# Patient Record
Sex: FEMALE | Race: BLACK OR AFRICAN AMERICAN | NOT HISPANIC OR LATINO | Employment: OTHER | URBAN - METROPOLITAN AREA
[De-identification: names, ages, dates, MRNs, and addresses within clinical notes are randomized per-mention and may not be internally consistent; named-entity substitution may affect disease eponyms.]

---

## 2017-02-03 ENCOUNTER — TELEPHONE (OUTPATIENT)
Dept: NEUROLOGY | Facility: CLINIC | Age: 37
End: 2017-02-03

## 2017-03-16 ENCOUNTER — OFFICE VISIT (OUTPATIENT)
Dept: INTERNAL MEDICINE | Facility: CLINIC | Age: 37
End: 2017-03-16
Payer: MEDICARE

## 2017-03-16 ENCOUNTER — OFFICE VISIT (OUTPATIENT)
Dept: NEUROLOGY | Facility: CLINIC | Age: 37
End: 2017-03-16
Payer: MEDICARE

## 2017-03-16 ENCOUNTER — LAB VISIT (OUTPATIENT)
Dept: LAB | Facility: HOSPITAL | Age: 37
End: 2017-03-16
Attending: PSYCHIATRY & NEUROLOGY
Payer: MEDICARE

## 2017-03-16 VITALS
HEART RATE: 69 BPM | BODY MASS INDEX: 28.21 KG/M2 | SYSTOLIC BLOOD PRESSURE: 111 MMHG | HEIGHT: 66 IN | DIASTOLIC BLOOD PRESSURE: 72 MMHG | WEIGHT: 175.5 LBS

## 2017-03-16 VITALS
BODY MASS INDEX: 28.16 KG/M2 | DIASTOLIC BLOOD PRESSURE: 72 MMHG | HEIGHT: 66 IN | WEIGHT: 175.25 LBS | TEMPERATURE: 98 F | SYSTOLIC BLOOD PRESSURE: 119 MMHG | HEART RATE: 71 BPM

## 2017-03-16 DIAGNOSIS — G70.00 MYASTHENIA GRAVIS WITHOUT EXACERBATION: ICD-10-CM

## 2017-03-16 DIAGNOSIS — M79.7 FIBROMYALGIA: ICD-10-CM

## 2017-03-16 DIAGNOSIS — E09.9 STEROID-INDUCED DIABETES: ICD-10-CM

## 2017-03-16 DIAGNOSIS — I15.2 HYPERTENSION ASSOCIATED WITH DIABETES: ICD-10-CM

## 2017-03-16 DIAGNOSIS — G89.29 NECK PAIN, CHRONIC: ICD-10-CM

## 2017-03-16 DIAGNOSIS — T38.0X5A STEROID-INDUCED DIABETES: ICD-10-CM

## 2017-03-16 DIAGNOSIS — G89.29 CHRONIC BILATERAL LOW BACK PAIN WITHOUT SCIATICA: ICD-10-CM

## 2017-03-16 DIAGNOSIS — G70.00 MYASTHENIA GRAVIS: ICD-10-CM

## 2017-03-16 DIAGNOSIS — R07.9 CHEST PAIN, UNSPECIFIED TYPE: ICD-10-CM

## 2017-03-16 DIAGNOSIS — G70.00 MG (MYASTHENIA GRAVIS): Primary | ICD-10-CM

## 2017-03-16 DIAGNOSIS — M54.2 NECK PAIN, CHRONIC: ICD-10-CM

## 2017-03-16 DIAGNOSIS — M54.50 CHRONIC BILATERAL LOW BACK PAIN WITHOUT SCIATICA: ICD-10-CM

## 2017-03-16 DIAGNOSIS — G70.00 MG (MYASTHENIA GRAVIS): ICD-10-CM

## 2017-03-16 DIAGNOSIS — M79.10 MUSCLE PAIN: Primary | ICD-10-CM

## 2017-03-16 DIAGNOSIS — E11.59 HYPERTENSION ASSOCIATED WITH DIABETES: ICD-10-CM

## 2017-03-16 DIAGNOSIS — E78.2 MIXED HYPERLIPIDEMIA: ICD-10-CM

## 2017-03-16 LAB
ALBUMIN SERPL BCP-MCNC: 3.6 G/DL
ALBUMIN SERPL BCP-MCNC: 3.6 G/DL
ALP SERPL-CCNC: 104 U/L
ALP SERPL-CCNC: 104 U/L
ALT SERPL W/O P-5'-P-CCNC: 26 U/L
ALT SERPL W/O P-5'-P-CCNC: 26 U/L
ANION GAP SERPL CALC-SCNC: 7 MMOL/L
AST SERPL-CCNC: 23 U/L
AST SERPL-CCNC: 23 U/L
BASOPHILS # BLD AUTO: 0.02 K/UL
BASOPHILS NFR BLD: 0.3 %
BILIRUB DIRECT SERPL-MCNC: 0.3 MG/DL
BILIRUB SERPL-MCNC: 0.8 MG/DL
BILIRUB SERPL-MCNC: 0.8 MG/DL
BUN SERPL-MCNC: 17 MG/DL
CALCIUM SERPL-MCNC: 9.6 MG/DL
CHLORIDE SERPL-SCNC: 108 MMOL/L
CO2 SERPL-SCNC: 28 MMOL/L
CREAT SERPL-MCNC: 0.8 MG/DL
DIFFERENTIAL METHOD: ABNORMAL
EOSINOPHIL # BLD AUTO: 0 K/UL
EOSINOPHIL NFR BLD: 0.1 %
ERYTHROCYTE [DISTWIDTH] IN BLOOD BY AUTOMATED COUNT: 15.9 %
EST. GFR  (AFRICAN AMERICAN): >60 ML/MIN/1.73 M^2
EST. GFR  (NON AFRICAN AMERICAN): >60 ML/MIN/1.73 M^2
GLUCOSE SERPL-MCNC: 82 MG/DL
HCT VFR BLD AUTO: 39.8 %
HGB BLD-MCNC: 12.4 G/DL
LYMPHOCYTES # BLD AUTO: 0.7 K/UL
LYMPHOCYTES NFR BLD: 10.3 %
MCH RBC QN AUTO: 30.6 PG
MCHC RBC AUTO-ENTMCNC: 31.2 %
MCV RBC AUTO: 98 FL
MONOCYTES # BLD AUTO: 0.4 K/UL
MONOCYTES NFR BLD: 5.2 %
NEUTROPHILS # BLD AUTO: 5.8 K/UL
NEUTROPHILS NFR BLD: 83.8 %
PLATELET # BLD AUTO: 205 K/UL
PMV BLD AUTO: 13.5 FL
POTASSIUM SERPL-SCNC: 3.9 MMOL/L
PROT SERPL-MCNC: 7.3 G/DL
PROT SERPL-MCNC: 7.3 G/DL
RBC # BLD AUTO: 4.05 M/UL
SODIUM SERPL-SCNC: 143 MMOL/L
WBC # BLD AUTO: 6.92 K/UL

## 2017-03-16 PROCEDURE — 99999 PR PBB SHADOW E&M-EST. PATIENT-LVL IV: CPT | Mod: PBBFAC,,, | Performed by: PHYSICIAN ASSISTANT

## 2017-03-16 PROCEDURE — 99999 PR PBB SHADOW E&M-EST. PATIENT-LVL III: CPT | Mod: PBBFAC,,, | Performed by: INTERNAL MEDICINE

## 2017-03-16 PROCEDURE — 99214 OFFICE O/P EST MOD 30 MIN: CPT | Mod: S$PBB,,, | Performed by: PHYSICIAN ASSISTANT

## 2017-03-16 PROCEDURE — 80053 COMPREHEN METABOLIC PANEL: CPT

## 2017-03-16 PROCEDURE — 83036 HEMOGLOBIN GLYCOSYLATED A1C: CPT

## 2017-03-16 PROCEDURE — 80076 HEPATIC FUNCTION PANEL: CPT

## 2017-03-16 PROCEDURE — 99214 OFFICE O/P EST MOD 30 MIN: CPT | Mod: S$PBB,,, | Performed by: INTERNAL MEDICINE

## 2017-03-16 PROCEDURE — 36415 COLL VENOUS BLD VENIPUNCTURE: CPT

## 2017-03-16 PROCEDURE — 85025 COMPLETE CBC W/AUTO DIFF WBC: CPT

## 2017-03-16 RX ORDER — PYRIDOSTIGMINE BROMIDE 60 MG/1
TABLET ORAL
Qty: 120 TABLET | Refills: 5 | Status: SHIPPED | OUTPATIENT
Start: 2017-03-16 | End: 2019-02-13 | Stop reason: SDUPTHER

## 2017-03-16 RX ORDER — PYRIDOSTIGMINE BROMIDE 180 MG/1
180 TABLET, EXTENDED RELEASE ORAL DAILY
Qty: 30 TABLET | Refills: 5 | Status: SHIPPED | OUTPATIENT
Start: 2017-03-16 | End: 2017-05-05 | Stop reason: SDUPTHER

## 2017-03-16 RX ORDER — AZATHIOPRINE 50 MG/1
100 TABLET ORAL 2 TIMES DAILY
Qty: 120 TABLET | Refills: 5 | Status: SHIPPED | OUTPATIENT
Start: 2017-03-16 | End: 2017-07-13 | Stop reason: SDUPTHER

## 2017-03-16 NOTE — MR AVS SNAPSHOT
[facility]  [address]  [facility] [address]  Phone: [phone]  Fax: [phone]                  Vicky Joy   3/16/2017 10:40 AM   Office Visit    Description:  Female : 1980   Provider:  Nirmal Razo II, MD   Department:  [facility] Internal [facility]           Reason for Visit     Hypertension     Diabetes           Diagnoses this Visit        Comments    Muscle pain    -  Primary     Hypertension associated with diabetes         Steroid-induced diabetes         MG (myasthenia gravis)         Mixed hyperlipidemia         Fibromyalgia                To Do List           Goals (5 Years of Data)     None      Follow-Up and Disposition     Return in about 6 months (around 2017).      [facility] On Call     [facility] On Call Nurse [facility] Line -  Assistance  Registered nurses in the [facility] On Call Center provide clinical advisement, health education, appointment booking, and other advisory services.  Call for this free service at [phone].             Medications           Message regarding Medications     Verify the changes and/or additions to your medication regime listed below are the same as discussed with your clinician today.  If any of these changes or additions are incorrect, please notify your healthcare provider.        STOP taking these medications     sulfamethoxazole-trimethoprim 800-160mg (BACTRIM DS) 800-160 mg Tab Take 1 tablet by mouth 3 (three) times daily.    oxycodone-acetaminophen (PERCOCET) 5-325 mg per tablet     gabapentin (NEURONTIN) 100 MG capsule Take 1 capsule (100 mg total) by mouth 3 (three) times daily.           Verify that the below list of medications is an accurate representation of the medications you are currently taking.  If none reported, the list may be blank. If incorrect, please contact your healthcare provider. Carry this list with you in case of emergency.           Current Medications     azathioprine  "(IMURAN) 50 mg Tab Take 2 tablets (100 mg total) by mouth 2 (two) times daily.    blood sugar diagnostic Strp 1 strip by Misc.(Non-Drug; Combo Route) route 2 (two) times daily before meals.    glipiZIDE (GLUCOTROL) 10 MG TR24 Take 1 tablet (10 mg total) by mouth daily with breakfast.    lancets (BD ULTRA FINE LANCETS) 33 gauge Misc 1 lancet by Misc.(Non-Drug; Combo Route) route 2 (two) times daily before meals.    lisinopril (PRINIVIL,ZESTRIL) 20 MG tablet Take 1 tablet (20 mg total) by mouth once daily.    meloxicam (MOBIC) 7.5 MG tablet TAKE 1 TABLET (7.5 MG TOTAL) BY MOUTH ONCE DAILY.    metformin (GLUCOPHAGE) 1000 MG tablet Take 1 tablet (1,000 mg total) by mouth 2 (two) times daily with meals.    nystatin (MYCOSTATIN) cream Apply topically 2 (two) times daily. FOR YEAST INFECTION UNDER BREASTS    omeprazole (PRILOSEC) 20 MG capsule TAKE 1 CAPSULE BY MOUTH ONCE DAILY.    penicillin v potassium (VEETID) 500 MG tablet 500 mg.    predniSONE (DELTASONE) 10 MG tablet Take 1 tablet (10 mg total) by mouth once daily.    pyridostigmine (MESTINON) 180 mg TbSR Take 1 tablet (180 mg total) by mouth once daily.    pyridostigmine (MESTINON) 60 mg Tab TAKE 1/2 TABLET BY MOUTH EVERY 4 HOURS    rosuvastatin (CRESTOR) 20 MG tablet Take 1 tablet (20 mg total) by mouth once daily.    vitamin D 1000 units Tab Take 185 mg by mouth once daily.    calcium carbonate (OS-MARISOL) 500 mg calcium (1,250 mg) tablet Take 1 tablet (500 mg total) by mouth 2 (two) times daily.           Clinical Reference Information           Your Vitals Were     BP Pulse Temp Height    119/72 (BP Location: Left arm, Patient Position: Sitting, BP Method: Automatic) 71 97.7 °F (36.5 °C) (Oral) 5' 6" (1.676 m)    Weight Last Period BMI    79.5 kg (175 lb 4.3 oz) 02/18/2017 (Exact Date) 28.29 kg/m2      Blood Pressure          Most Recent Value    BP  119/72      Allergies as of 3/16/2017     Cellcept [Mycophenolate Mofetil]      Immunizations Administered on Date " of Encounter - 3/16/2017     None      Orders Placed During Today's Visit     Future Labs/Procedures Expected by Expires    Comprehensive metabolic panel  3/16/2017 6/14/2017    Hemoglobin A1c  3/16/2017 6/14/2017      Language Assistance Services     ATTENTION: Language assistance services are available, free of charge. Please call [phone].      ATENCIÓN: Si habla español, tiene a garcia disposición servicios gratuitos de asistencia lingüística. Llame al [phone].     CHÚ Ý: N?u b?n nói Ti?ng Vi?t, có các d?ch v? h? tr? ngôn ng? mi?n phí dành cho b?n. G?i s? [phone].         [facility] complies with applicable Federal civil rights laws and does not discriminate on the basis of race, color, national origin, age, disability, or sex.

## 2017-03-16 NOTE — PROGRESS NOTES
[facility], Department of Neurology   [facility] [place] [address]  Phone:[phone]  Fax: [phone]    Patient Name: Vicky Joy  : 1980  MRN:  [id number]    3/16/2017     chief complaint: myasthenia gravis     PCP: Nirmal Razo Ii, MD.    DX: Myasthenia gravis  Thymic status: Thymectomy fall   Maintenance: Imuran 100mg/50mg since 2015  Pred 20mg/d -> 15mg in May of 2015 --> 11 mg 2016   Mestinon 30mg q4, Timespan 180mg qAM  Rescue: PLEX; never had IVIG  Prior immunemodulatory agents: cellcept    Interval hx 3/16/17:  Vicky Joy is a 35 yo female with MG dx , s/p thymectomy .     Currently on Prednisone 10 mg qd, Imuran 100/100 mg, Timespan 180 mg qAM, and Mestinon 30 mg q4.     Notes SULLIVAN. No difficulty chewing/swallowing. Intermittent ptosis and diplopia which usually lasts a few minutes. Also notes watery eyes.     Notes chronic neck and back pain. Also pain in legs. Describes as intermittent aching. Worse with activity. PCP prescribed Gabapentin, has not helped. Hasn't done PT.     Also notes chronic chest pain since thymectomy. Occurs about every other day, usually lasts a few hrs. Takes Tylenol with minor relief.     Interval hx 16:  Vicky Joy is a 35 yo female with generalized MG dx'ed in , s/p thymectomy in . Symptoms are stable from last visit. Still has good and bad days. Has some occasional SULLIVAN with walking, takes a break and can keep going. Arms and legs intermittently weak. Diplopia has improved. Has wisdom tooth with pain, eating soft food, no problems chewing/swallowing     Interval hx 10/7/16:  Vicky Joy is a 35 yo female with generalized MG dx'ed in , s/p thymectomy in . Has been about 9 mos since last clinic visit. She states she has been doing fairly well since last being seen. However, also states she has some bad days which occur a few times a week, on these days she  "feels tired, lays in bed, "doesn't feel well", also states feels weaker in arms and legs. Has intermittent diplopia about once per month. Also complains of increased SULLIVAN over the past few weeks, occurs when walking. +weight gain 2/2 prednisone. Denies difficulty chewing or swallowing. DM currently well-controlled.    Current medications include:  Prednisone 11 mg daily (last visit was decreased from 15 mg to 12, has been taking 11 mg)   Imuran 100/50   Timespan 180 qam   Mestinon 30 q 4x/day     Interval 1/13/16: Doing well. Occasional ptosis but no diplopia. No dysarthria or dysphagia.     Interval 9/18/15: Doing about the same, to a little weaker in her shoulders. No other new symptoms.     Interval 5/8/15: Now taking imuran 100mg bid (she weighs 86kg, so just over 2mg/kg). Doing well from MG standpoint.     Interval 2/6/15: Missed appointment with me in Nov. Returns today. MG stable, but having back pain. Last bone scans in July 2014.      Was supposed to be on imuran 100mg bid, but only taking it 50mg bid. Comes on bottle as 50mg bid, despite my writing it otherwise.      Interval 10/7/14: Doing about the same. Still with end of the day diplopia and ptosis. No chewing or swallowing difficulties. Strength is good.      HPI: Vicky Joy is a 35 y.o. female with generalized MG who returns today for f/u. She increased her imuran as I instructed her to do. Her disease burden is better.    HPI 2/18/13: Vicky Joy is a 32 y.o. female with a diagnosis of generalized MG diagnosed in 2010 after presenting with difficulty chewing, swallowing and generalized weakness. She presented at that time in crisis and was intubated and admitted to a critical care unit, where the diagnosis was made.      She has been on prednisone and mestinon since her diagnosis, and is currently on 25mg/day. She has not had any subsequent hospitalizations, but has had several rounds of PLEX as an outpatient. Her last exchange was (she " matthew) was in 2012.      She has diplopia and generalized weakness at baseline, but in the last few weeks, she has had increasing lower extremity weakness and slightly more difficulty swallowing.     She had a thymectomy in the fall of 2011.     Medications:   Current Outpatient Prescriptions   Medication Sig Dispense Refill    azathioprine (IMURAN) 50 mg Tab Take 2 tablets (100 mg total) by mouth 2 (two) times daily. 120 tablet 5    blood sugar diagnostic Strp 1 strip by Misc.(Non-Drug; Combo Route) route 2 (two) times daily before meals. 50 strip 3    glipiZIDE (GLUCOTROL) 10 MG TR24 Take 1 tablet (10 mg total) by mouth daily with breakfast. 90 tablet 3    lancets (BD ULTRA FINE LANCETS) 33 gauge Misc 1 lancet by Misc.(Non-Drug; Combo Route) route 2 (two) times daily before meals. 50 each 3    lisinopril (PRINIVIL,ZESTRIL) 20 MG tablet Take 1 tablet (20 mg total) by mouth once daily. 90 tablet 3    meloxicam (MOBIC) 7.5 MG tablet TAKE 1 TABLET (7.5 MG TOTAL) BY MOUTH ONCE DAILY. 90 tablet 3    metformin (GLUCOPHAGE) 1000 MG tablet Take 1 tablet (1,000 mg total) by mouth 2 (two) times daily with meals. 180 tablet 3    nystatin (MYCOSTATIN) cream Apply topically 2 (two) times daily. FOR YEAST INFECTION UNDER BREASTS 60 g 3    omeprazole (PRILOSEC) 20 MG capsule TAKE 1 CAPSULE BY MOUTH ONCE DAILY. 90 capsule 2    penicillin v potassium (VEETID) 500 MG tablet 500 mg.      predniSONE (DELTASONE) 10 MG tablet Take 1 tablet (10 mg total) by mouth once daily. 30 tablet 5    pyridostigmine (MESTINON) 180 mg TbSR Take 1 tablet (180 mg total) by mouth once daily. 30 tablet 5    pyridostigmine (MESTINON) 60 mg Tab TAKE 1/2 TABLET BY MOUTH EVERY 4 HOURS 120 tablet 5    rosuvastatin (CRESTOR) 20 MG tablet Take 1 tablet (20 mg total) by mouth once daily. 90 tablet 3    vitamin D 1000 units Tab Take 185 mg by mouth once daily.      calcium carbonate (OS-MARISOL) 500 mg calcium (1,250 mg) tablet Take 1 tablet (500 mg  "total) by mouth 2 (two) times daily. 180 tablet 3     No current facility-administered medications for this visit.        Allergies:  Review of patient's allergies indicates:   Allergen Reactions    Cellcept [mycophenolate mofetil]      Weakness (muscle)^       PMHx:  Past Medical History:   Diagnosis Date    Diabetes mellitus type II     Headache(784.0)     Hypertension     Myasthenia gravis without exacerbation     Obesity     Other and unspecified hyperlipidemia      Past Surgical History:   Procedure Laterality Date    SPINE SURGERY      THYMECTOMY         Fhx:  Family History   Problem Relation Age of Onset    Cancer Mother      breast cancer survivor    Diabetes Father        Shx:   Social History     Social History    Marital status: Single     Spouse name: N/A    Number of children: N/A    Years of education: N/A     Occupational History    Not on file.     Social History Main Topics    Smoking status: Never Smoker    Smokeless tobacco: Never Used    Alcohol use No    Drug use: No    Sexual activity: No     Other Topics Concern    Not on file     Social History Narrative       ROS:  Review of Systems (Questions asked; positives or additions noted in BOLD)  Gen Malaise/fatigue, weight change   HEENT Hearing loss, blurred vision, diplopia (intermittent)   Card CP, palpitations   Pulm SOB (intermittent SULLIVAN), cough    Vasc Easy bruising, easy bleeding    GI Nausea, vomiting, constipation    Frequency, urgency   M/S Joint pain, myalgias, falls    Endocrine Temperature intolerance, polyuria, polydipsia   Neuro Dizziness, tremors, seizures, focal weakness, Others- as noted in HPI   Psych Memory loss, depression, anxiety, hallucinations     PHYSICAL EXAM:   /72  Pulse 69  Ht 5' 6" (1.676 m)  Wt 79.6 kg (175 lb 7.8 oz)  LMP 02/18/2017 (Exact Date)  BMI 28.32 kg/m2   GEN:  NAD, well-developed, well-groomed.  HEENT: No cervical lymphadenopathy noted.  CARDIOVASCULAR: Radial pulses 2+ " bilaterally. No LE edema noted.  NEURO:  Mental Status: Awake, alert, and oriented. Normal attention and concentration.  Speech is fluent and appropriate language function.    Cranial Nerves:  II Pupils are round and reactive to direct and consensual light and accommodation. Visual fields full to confrontation.   III, IV, VI Extraocular eye movements full bilaterally. No ptosis noted.   V Normal facial sensation to pinprick and light touch.   VII Symmetric facial expressions.   VIII Hearing intact to finger rub bilaterally.   IX, X Palate elevates midline and symmetrically.   XI Shoulder elevation is symmetric and full strength bilaterally. Neck rotation strength is normal bilaterally.   XII Tongue is midline.     Sensory: Sensation is normal to light touch in the upper and lower extremities bilaterally. Romberg is Negative.    Motor: Extremities demonstrate normal bulk and tone in all four limbs. No atrophy or fasciculations noted.   Strength-       RUE: 5/5                LUE: 5/5                RLE: 5/5                LLE: 5/5     Neck flexion/extension: 5/5  Good orbicularis oris strength.Gppd orbicularis oculi strength  No SOB noted during exam.    Deep Tendon Relfexes: 2+ and symmetric in the upper and lower extremities bilaterally. Babinski mute bilaterally.    Coordination: Finger to nose WNL.     Gait: Normal casual gait.    ASSESSMENT:     ICD-10-CM ICD-9-CM   1. MG (myasthenia gravis) G70.00 358.00   2. Chronic bilateral low back pain without sciatica M54.5 724.2    G89.29 338.29   3. Neck pain, chronic M54.2 723.1    G89.29 338.29   4. Myasthenia gravis G70.00 358.00   5. Myasthenia gravis without exacerbation G70.00 358.00   6. Chest pain, unspecified type R07.9 786.50       PLAN:  Orders Placed This Encounter    CBC auto differential    Hepatic function panel    Ambulatory consult to Physical Therapy    Ambulatory Referral to Cardiology    Complete PFT with bronchodilator    azathioprine (IMURAN)  50 mg Tab    pyridostigmine (MESTINON) 180 mg TbSR    pyridostigmine (MESTINON) 60 mg Tab     Orders Placed This Encounter   Procedures    CBC auto differential    Hepatic function panel    Ambulatory consult to Physical Therapy    Ambulatory Referral to Cardiology    Complete PFT with bronchodilator     35 yo female with generalized MG. Symptoms remain fairly stable. Has some days where she feels tired and weaker. Query some deconditioning as well.     -labs today   -refer to Cardiology to rule out contributing cardiac component to chest pain  -PFTs  -continue current meds   -physical therapy script given for neck/back pain   -RTC 3 mos         The patient indicates understanding of these issues and agrees to the plan.      Attending, Dr. Castellon, was available during today's encounter. Any change to plan along with cosign to appear in the EMR.       This document has been electronically signed by Mable Madden PA-C on 3/16/2017, 10:56 AM. I have personally typed this message using the EMR.

## 2017-03-16 NOTE — LETTER
March 16, 2017      Nirmal Razo II, MD  [address]  [place] [address]           [facility]  [address]  [place] [address]  Phone: [phone]  Fax: [phone]          Patient: Vicky Joy   MR Number: [id number]   YOB: 1980   Date of Visit: 3/16/2017       Dear Dr. Nirmal Razo II:    Thank you for referring Vicky Joy to me for evaluation. Attached you will find relevant portions of my assessment and plan of care.    If you have questions, please do not hesitate to call me. I look forward to following Vicky Joy along with you.    Sincerely,    Mable Madden PA-C    Enclosure  CC:  No Recipients    If you would like to receive this communication electronically, please contact [email or web address] or [phone] to request more information on [employer] Link access.    For providers and/or their staff who would like to refer a patient to [facility], please contact us through our one-stop-shop provider referral line, [facility], at [phone].    If you feel you have received this communication in error or would no longer like to receive these types of communications, please e-mail [email or web address]

## 2017-03-16 NOTE — PROGRESS NOTES
Subjective:       Patient ID: Vicky Joy is a 36 y.o. female.    Chief Complaint: Hypertension and Diabetes    HPI - Ms Joy feels well today.  She's having worsening fibromyalgia; neurontin isn't working and interferes with MG control, so we need to stop it anyway. She's going to start going to PT.  Taking her antihypertensives and diabetes medication.  Last a1c at goal, but due for a repeat. She doesn't smoke or drink alcohol.    PMH:  Myasthenia on steroids long term  Steroid induced DM  HTN  Fibromyalgia     Meds:  Reviewed and reconciled in EPIC with patient during visit today.    Review of Systems   Constitutional: Negative for fever.   HENT: Negative for congestion.    Respiratory: Positive for shortness of breath (from her myasthenia).    Cardiovascular: Negative for chest pain.   Gastrointestinal: Negative for abdominal pain.   Genitourinary: Negative for difficulty urinating.   Musculoskeletal: Positive for myalgias.   Skin: Negative for rash.   Neurological: Negative for headaches.   Psychiatric/Behavioral: Negative for sleep disturbance.       Objective:      Physical Exam   Constitutional: She is oriented to person, place, and time. She appears well-developed and well-nourished.   Pleasant woman with moon facies and buffalo hump   HENT:   Head: Normocephalic and atraumatic.   Cardiovascular: Normal rate, regular rhythm and normal heart sounds.  Exam reveals no gallop and no friction rub.    No murmur heard.  Pulmonary/Chest: Effort normal and breath sounds normal. No respiratory distress. She has no wheezes. She has no rales. She exhibits no tenderness.   Musculoskeletal:   Tender in typical trigger points for FM   Neurological: She is alert and oriented to person, place, and time.   Skin: Skin is warm and dry. No erythema.   Psychiatric: She has a normal mood and affect.   Nursing note and vitals reviewed.      Assessment:       1. Muscle pain    2. Hypertension associated with diabetes     3. Steroid-induced diabetes    4. MG (myasthenia gravis)    5. Mixed hyperlipidemia    6. Fibromyalgia        Plan:       Vicky was seen today for hypertension and diabetes.    Diagnoses and all orders for this visit:    Muscle pain - stop gabapentin, start PT.  Consider an SSRI for pain control if PT isn't enough    Hypertension associated with diabetes - at goal, stay the course    Steroid-induced diabetes - at goal, time for monitoring labs  -     Hemoglobin A1c; Future  -     Comprehensive metabolic panel; Future    MG (myasthenia gravis) - sees neurology regularly.  Stable    Mixed hyperlipidemia - stable, on a statin    Fibromyalgia - worsening, as above.    rtc 6 months.    LEE Razo MD MPH  Staff Internist

## 2017-03-17 LAB
ESTIMATED AVG GLUCOSE: 148 MG/DL
HBA1C MFR BLD HPLC: 6.8 %

## 2017-04-05 DIAGNOSIS — I20.89 OTHER FORMS OF ANGINA PECTORIS: Primary | ICD-10-CM

## 2017-04-06 ENCOUNTER — HOSPITAL ENCOUNTER (OUTPATIENT)
Dept: CARDIOLOGY | Facility: CLINIC | Age: 37
Discharge: HOME | End: 2017-04-06
Payer: MEDICARE

## 2017-04-06 ENCOUNTER — OFFICE VISIT (OUTPATIENT)
Dept: CARDIOLOGY | Facility: CLINIC | Age: 37
End: 2017-04-06
Payer: MEDICARE

## 2017-04-06 VITALS
SYSTOLIC BLOOD PRESSURE: 124 MMHG | HEIGHT: 66 IN | BODY MASS INDEX: 27.99 KG/M2 | WEIGHT: 174.19 LBS | HEART RATE: 67 BPM | DIASTOLIC BLOOD PRESSURE: 74 MMHG

## 2017-04-06 DIAGNOSIS — I20.89 OTHER FORMS OF ANGINA PECTORIS: ICD-10-CM

## 2017-04-06 DIAGNOSIS — I15.2 HYPERTENSION ASSOCIATED WITH DIABETES: Primary | ICD-10-CM

## 2017-04-06 DIAGNOSIS — E11.59 HYPERTENSION ASSOCIATED WITH DIABETES: Primary | ICD-10-CM

## 2017-04-06 PROCEDURE — 93010 ELECTROCARDIOGRAM REPORT: CPT | Mod: S$PBB,,, | Performed by: INTERNAL MEDICINE

## 2017-04-06 PROCEDURE — 99203 OFFICE O/P NEW LOW 30 MIN: CPT | Mod: S$PBB,GC,, | Performed by: INTERNAL MEDICINE

## 2017-04-06 PROCEDURE — 99999 PR PBB SHADOW E&M-EST. PATIENT-LVL III: CPT | Mod: PBBFAC,GC,, | Performed by: INTERNAL MEDICINE

## 2017-04-06 PROCEDURE — 99213 OFFICE O/P EST LOW 20 MIN: CPT | Mod: PBBFAC | Performed by: INTERNAL MEDICINE

## 2017-04-06 NOTE — PROGRESS NOTES
Subjective:    Patient ID:  Vicky Joy is a 36 y.o. female who presents for evaluation of Chest Pain (x 3 weeks )      HPI  37 yo AAF with hx of myasthenia Gravis s/p Thymectomy in 2011, HLD, DM, and HTN.  She reports chest pain that is central and throbbing in nature.  The pain is non-exertional and reproducible with palpation.  There are no associated symptoms.  She reports that happens about every 3 days, and last about one hour.  She denies orthopnea or syncope.  She is a non-smoker.  No family hx of CAD.  She does have shortness of breath and muscle weakness both with exertion that she contributes her myasthenia gravis.     Past Medical History:   Diagnosis Date    Diabetes mellitus type II     Headache(784.0)     Hypertension     Myasthenia gravis without exacerbation     Obesity     Other and unspecified hyperlipidemia      Past Surgical History:   Procedure Laterality Date    SPINE SURGERY      THYMECTOMY         Review of Systems   Constitution: Negative for chills, diaphoresis and fever.   Eyes: Negative for blurred vision.   Cardiovascular: Positive for chest pain and dyspnea on exertion. Negative for claudication, irregular heartbeat, leg swelling, orthopnea, palpitations, paroxysmal nocturnal dyspnea and syncope.   Respiratory: Negative for cough and shortness of breath.    Hematologic/Lymphatic: Does not bruise/bleed easily.   Skin: Negative for rash.   Musculoskeletal: Negative for falls.   Gastrointestinal: Negative for abdominal pain, diarrhea, nausea and vomiting.   Neurological: Negative for focal weakness.        Objective:    Physical Exam   Constitutional: She is oriented to person, place, and time. No distress.   HENT:   Head: Normocephalic and atraumatic.   Eyes: Conjunctivae and EOM are normal.   Neck: No JVD present. No tracheal deviation present.   Cardiovascular: Normal rate, regular rhythm and intact distal pulses.    Pulmonary/Chest: Breath sounds normal. No stridor.  No respiratory distress.   Abdominal: Soft. Bowel sounds are normal. She exhibits no distension. There is no tenderness.   Musculoskeletal: She exhibits no edema.   Neurological: She is alert and oriented to person, place, and time. No cranial nerve deficit.   Skin: Skin is warm and dry. She is not diaphoretic.         Assessment:       1. Hypertension associated with diabetes    2. Chest pain with noncardiac features         Plan:       #Chest pain is non-cardiac in nature given reproducibility with palpation and non-exertional nature with normal ECG.  Her dyspnea on exertion seems to be more related to her muscular weakness given it's cyclic nature consistent with MG flares.   #HTN well controlled.  Agree with ace-i as the agent of choice given her DM  #no cardiology follow up needed

## 2017-04-06 NOTE — PROGRESS NOTES
I have personally taken the history and examined this patient and agree with the Fellow's note as stated above.    Chest discomfort just as likely to occur when sedentary as when active.  Low risk for having CAD and nature of discomfort suggest no further w/u indicated.

## 2017-04-06 NOTE — MR AVS SNAPSHOT
[facility] - Cardiology  [address]  [facility] [address]  Phone: [phone]                  Vicky Joy   2017 10:00 AM   Office Visit    Description:  Female : 1980   Provider:  Adarsh Sanchez Jr., MD   Department:  [facility]           Reason for Visit     Chest Pain                To Do List           Future Appointments        Provider Department Dept Phone    2017 10:00 AM MD [facility] Alicea Jr. [facility] Cardiology [phone]    2017 10:00 AM Brendan Castellon MD [facility] Neurology [phone]      Goals (5 Years of Data)     None      [facility] On [facility]     [facility] On Call Nurse Care Line -  Assistance  Unless otherwise directed by your provider, please contact [facility], our nurse care line that is available for  assistance.     Registered nurses in the [facility] Call Center provide: appointment scheduling, clinical advisement, health education, and other advisory services.  Call: [phone] (toll free)               Medications           Message regarding Medications     Verify the changes and/or additions to your medication regime listed below are the same as discussed with your clinician today.  If any of these changes or additions are incorrect, please notify your healthcare provider.        STOP taking these medications     penicillin v potassium (VEETID) 500 MG tablet 500 mg.           Verify that the below list of medications is an accurate representation of the medications you are currently taking.  If none reported, the list may be blank. If incorrect, please contact your healthcare provider. Carry this list with you in case of emergency.           Current Medications     azathioprine (IMURAN) 50 mg Tab Take 2 tablets (100 mg total) by mouth 2 (two) times daily.    blood sugar diagnostic Strp 1 strip by Misc.(Non-Drug; Combo Route) route 2 (two) times daily before meals.    calcium carbonate (OS-MARISOL)  "500 mg calcium (1,250 mg) tablet Take 1 tablet (500 mg total) by mouth 2 (two) times daily.    glipiZIDE (GLUCOTROL) 10 MG TR24 Take 1 tablet (10 mg total) by mouth daily with breakfast.    lancets (BD ULTRA FINE LANCETS) 33 gauge Misc 1 lancet by Misc.(Non-Drug; Combo Route) route 2 (two) times daily before meals.    lisinopril (PRINIVIL,ZESTRIL) 20 MG tablet Take 1 tablet (20 mg total) by mouth once daily.    meloxicam (MOBIC) 7.5 MG tablet TAKE 1 TABLET (7.5 MG TOTAL) BY MOUTH ONCE DAILY.    metformin (GLUCOPHAGE) 1000 MG tablet Take 1 tablet (1,000 mg total) by mouth 2 (two) times daily with meals.    nystatin (MYCOSTATIN) cream Apply topically 2 (two) times daily. FOR YEAST INFECTION UNDER BREASTS    omeprazole (PRILOSEC) 20 MG capsule TAKE 1 CAPSULE BY MOUTH ONCE DAILY.    predniSONE (DELTASONE) 10 MG tablet Take 1 tablet (10 mg total) by mouth once daily.    pyridostigmine (MESTINON) 180 mg TbSR Take 1 tablet (180 mg total) by mouth once daily.    pyridostigmine (MESTINON) 60 mg Tab TAKE 1/2 TABLET BY MOUTH EVERY 4 HOURS    rosuvastatin (CRESTOR) 20 MG tablet Take 1 tablet (20 mg total) by mouth once daily.    vitamin D 1000 units Tab Take 185 mg by mouth once daily.           Clinical Reference Information           Your Vitals Were     BP Pulse Height Weight Last Period BMI    124/74 (BP Location: Left arm, Patient Position: Sitting, BP Method: Automatic) 67 5' 6" (1.676 m) 79 kg (174 lb 2.6 oz) 02/18/2017 (Exact Date) 28.11 kg/m2      Blood Pressure          Most Recent Value    Right Arm BP - Sitting  128/76    Left Arm BP - Sitting  124/74    BP  124/74      Allergies as of 4/6/2017     Cellcept [Mycophenolate Mofetil]      Immunizations Administered on Date of Encounter - 4/6/2017     None      Language Assistance Services     ATTENTION: Language assistance services are available, free of charge. Please call [phone].      ATENCIÓN: Si habla español, tiene a garcia disposición servicios gratuitos de " asistencia lingüística. Wilfredo al [phone].     LEILANI Ý: N?u b?n nói Ti?ng Vi?t, có các d?ch v? h? tr? ngôn ng? mi?n phí dành cho b?n. G?i s? [phone].         [facility] complies with applicable Federal civil rights laws and does not discriminate on the basis of race, color, national origin, age, disability, or sex.

## 2017-04-13 RX ORDER — GABAPENTIN 100 MG/1
100 CAPSULE ORAL 3 TIMES DAILY
Qty: 270 CAPSULE | Refills: 3 | OUTPATIENT
Start: 2017-04-13 | End: 2018-04-13

## 2017-04-17 DIAGNOSIS — M79.7 FIBROMYALGIA: ICD-10-CM

## 2017-04-17 RX ORDER — GABAPENTIN 100 MG/1
100 CAPSULE ORAL 3 TIMES DAILY
Qty: 270 CAPSULE | Refills: 3 | Status: CANCELLED | OUTPATIENT
Start: 2017-04-17 | End: 2018-04-17

## 2017-04-17 NOTE — TELEPHONE ENCOUNTER
Patient is requesting refill on medication but it looks like you d/c it back in March. Please advise

## 2017-04-17 NOTE — TELEPHONE ENCOUNTER
Please call both her and the pharmacy.  She cannot use this medication b/c of her diagnosis if myasthenia.

## 2017-04-17 NOTE — TELEPHONE ENCOUNTER
----- Message from Pamela Dennis sent at 4/17/2017 10:05 AM CDT -----  Contact: Amaya/AVRIL/ [phone]   Type: Rx    Name of medication(s): gabapentin (NEURONTIN) 100 MG capsule    Is this a refill? New rx? Refill     Who prescribed medication? Dr. Razo     Pharmacy Name, Phone, & Location: [facility] on file     Comments: Amaya is calling to have a refill on the medication above. Please call and advise       Thank you

## 2017-05-05 DIAGNOSIS — G70.00 MYASTHENIA GRAVIS: ICD-10-CM

## 2017-05-05 DIAGNOSIS — G70.00 MYASTHENIA GRAVIS WITHOUT EXACERBATION: ICD-10-CM

## 2017-05-08 RX ORDER — PYRIDOSTIGMINE BROMIDE 180 MG/1
180 TABLET, EXTENDED RELEASE ORAL DAILY
Qty: 30 TABLET | Refills: 5 | Status: SHIPPED | OUTPATIENT
Start: 2017-05-08 | End: 2018-03-23 | Stop reason: SDUPTHER

## 2017-05-14 DIAGNOSIS — G70.00 MYASTHENIA GRAVIS WITHOUT EXACERBATION: ICD-10-CM

## 2017-05-16 RX ORDER — OMEPRAZOLE 20 MG/1
CAPSULE, DELAYED RELEASE ORAL
Qty: 90 CAPSULE | Refills: 2 | Status: SHIPPED | OUTPATIENT
Start: 2017-05-16 | End: 2018-02-18 | Stop reason: SDUPTHER

## 2017-05-16 RX ORDER — AZATHIOPRINE 50 MG/1
TABLET ORAL
Qty: 270 TABLET | Refills: 0 | Status: SHIPPED | OUTPATIENT
Start: 2017-05-16 | End: 2017-07-13

## 2017-07-12 DIAGNOSIS — G70.00 MYASTHENIA GRAVIS WITHOUT EXACERBATION: ICD-10-CM

## 2017-07-12 DIAGNOSIS — G70.00 MYASTHENIA GRAVIS: ICD-10-CM

## 2017-07-12 RX ORDER — AZATHIOPRINE 50 MG/1
100 TABLET ORAL 2 TIMES DAILY
Qty: 120 TABLET | Refills: 5 | Status: CANCELLED | OUTPATIENT
Start: 2017-07-12 | End: 2018-07-12

## 2017-07-13 DIAGNOSIS — G70.00 MYASTHENIA GRAVIS WITHOUT EXACERBATION: ICD-10-CM

## 2017-07-13 DIAGNOSIS — G70.00 MYASTHENIA GRAVIS: ICD-10-CM

## 2017-07-13 RX ORDER — AZATHIOPRINE 50 MG/1
100 TABLET ORAL 2 TIMES DAILY
Qty: 120 TABLET | Refills: 11 | Status: SHIPPED | OUTPATIENT
Start: 2017-07-13 | End: 2017-09-14 | Stop reason: SDUPTHER

## 2017-08-27 DIAGNOSIS — T38.0X5A STEROID-INDUCED DIABETES: ICD-10-CM

## 2017-08-27 DIAGNOSIS — E78.5 HYPERLIPIDEMIA, UNSPECIFIED HYPERLIPIDEMIA TYPE: ICD-10-CM

## 2017-08-27 DIAGNOSIS — E09.9 STEROID-INDUCED DIABETES: ICD-10-CM

## 2017-08-28 RX ORDER — LISINOPRIL 20 MG/1
20 TABLET ORAL DAILY
Qty: 90 TABLET | Refills: 3 | Status: SHIPPED | OUTPATIENT
Start: 2017-08-28 | End: 2018-08-20 | Stop reason: SDUPTHER

## 2017-08-28 RX ORDER — GLIPIZIDE 10 MG/1
TABLET ORAL
Qty: 90 TABLET | Refills: 3 | Status: SHIPPED | OUTPATIENT
Start: 2017-08-28 | End: 2018-08-23 | Stop reason: SDUPTHER

## 2017-08-28 RX ORDER — ROSUVASTATIN CALCIUM 20 MG/1
20 TABLET, COATED ORAL DAILY
Qty: 90 TABLET | Refills: 3 | Status: SHIPPED | OUTPATIENT
Start: 2017-08-28 | End: 2018-08-25 | Stop reason: SDUPTHER

## 2017-09-14 ENCOUNTER — IMMUNIZATION (OUTPATIENT)
Dept: INTERNAL MEDICINE | Facility: CLINIC | Age: 37
End: 2017-09-14
Payer: MEDICARE

## 2017-09-14 ENCOUNTER — OFFICE VISIT (OUTPATIENT)
Dept: INTERNAL MEDICINE | Facility: CLINIC | Age: 37
End: 2017-09-14
Payer: MEDICARE

## 2017-09-14 VITALS
HEART RATE: 66 BPM | SYSTOLIC BLOOD PRESSURE: 118 MMHG | DIASTOLIC BLOOD PRESSURE: 70 MMHG | BODY MASS INDEX: 29.16 KG/M2 | WEIGHT: 181.44 LBS | HEIGHT: 66 IN | TEMPERATURE: 98 F

## 2017-09-14 DIAGNOSIS — E11.59 HYPERTENSION ASSOCIATED WITH DIABETES: ICD-10-CM

## 2017-09-14 DIAGNOSIS — I15.2 HYPERTENSION ASSOCIATED WITH DIABETES: ICD-10-CM

## 2017-09-14 DIAGNOSIS — T38.0X5A STEROID-INDUCED DIABETES: ICD-10-CM

## 2017-09-14 DIAGNOSIS — R35.0 URINARY FREQUENCY: ICD-10-CM

## 2017-09-14 DIAGNOSIS — G70.00 MYASTHENIA GRAVIS: Primary | ICD-10-CM

## 2017-09-14 DIAGNOSIS — E78.2 MIXED HYPERLIPIDEMIA: ICD-10-CM

## 2017-09-14 DIAGNOSIS — E09.9 STEROID-INDUCED DIABETES: ICD-10-CM

## 2017-09-14 LAB
AMORPH CRY UR QL COMP ASSIST: NORMAL
BILIRUB UR QL STRIP: NEGATIVE
CLARITY UR REFRACT.AUTO: ABNORMAL
COLOR UR AUTO: YELLOW
GLUCOSE UR QL STRIP: NEGATIVE
HGB UR QL STRIP: NEGATIVE
KETONES UR QL STRIP: NEGATIVE
LEUKOCYTE ESTERASE UR QL STRIP: NEGATIVE
MICROSCOPIC COMMENT: NORMAL
NITRITE UR QL STRIP: NEGATIVE
PH UR STRIP: 7 [PH] (ref 5–8)
PROT UR QL STRIP: NEGATIVE
SP GR UR STRIP: 1.01 (ref 1–1.03)
SQUAMOUS #/AREA URNS AUTO: 3 /HPF
URN SPEC COLLECT METH UR: ABNORMAL
UROBILINOGEN UR STRIP-ACNC: NEGATIVE EU/DL

## 2017-09-14 PROCEDURE — 3074F SYST BP LT 130 MM HG: CPT | Mod: ,,, | Performed by: INTERNAL MEDICINE

## 2017-09-14 PROCEDURE — 3078F DIAST BP <80 MM HG: CPT | Mod: ,,, | Performed by: INTERNAL MEDICINE

## 2017-09-14 PROCEDURE — 4010F ACE/ARB THERAPY RXD/TAKEN: CPT | Mod: ,,, | Performed by: INTERNAL MEDICINE

## 2017-09-14 PROCEDURE — G0008 ADMIN INFLUENZA VIRUS VAC: HCPCS | Mod: PBBFAC

## 2017-09-14 PROCEDURE — 3044F HG A1C LEVEL LT 7.0%: CPT | Mod: ,,, | Performed by: INTERNAL MEDICINE

## 2017-09-14 PROCEDURE — 99214 OFFICE O/P EST MOD 30 MIN: CPT | Mod: S$PBB,,, | Performed by: INTERNAL MEDICINE

## 2017-09-14 PROCEDURE — 81001 URINALYSIS AUTO W/SCOPE: CPT

## 2017-09-14 PROCEDURE — 99999 PR PBB SHADOW E&M-EST. PATIENT-LVL III: CPT | Mod: PBBFAC,,, | Performed by: INTERNAL MEDICINE

## 2017-09-14 PROCEDURE — 99213 OFFICE O/P EST LOW 20 MIN: CPT | Mod: PBBFAC,25 | Performed by: INTERNAL MEDICINE

## 2017-09-14 RX ORDER — METFORMIN HYDROCHLORIDE 1000 MG/1
1000 TABLET ORAL 2 TIMES DAILY WITH MEALS
Qty: 180 TABLET | Refills: 3 | Status: SHIPPED | OUTPATIENT
Start: 2017-09-14 | End: 2021-12-17

## 2017-09-14 RX ORDER — AZATHIOPRINE 50 MG/1
100 TABLET ORAL 2 TIMES DAILY
Qty: 360 TABLET | Refills: 3 | Status: SHIPPED | OUTPATIENT
Start: 2017-09-14 | End: 2019-02-18 | Stop reason: SDUPTHER

## 2017-09-14 NOTE — PROGRESS NOTES
Subjective:       Patient ID: Vicky Joy is a 37 y.o. female.    Chief Complaint: Diabetes    HPI - Vicky has been having throbbing/burning in both feet for the past few weeks.  Knees and ankles hurt when the weather gets colder.  She's urinating more frequently; some abdominal bloating.  She's a nonsmoker, nondrinker.     PMH:  Myasthenia on steroids long term  Steroid induced DM  HTN  Fibromyalgia     Meds:  Reviewed and reconciled in EPIC with patient during visit today.     Review of Systems   Constitutional: Negative for fever.   HENT: Negative for congestion.    Respiratory: Negative for shortness of breath.    Cardiovascular: Negative for chest pain.   Gastrointestinal: Positive for abdominal distention.   Genitourinary: Positive for frequency. Negative for dysuria.   Musculoskeletal: Positive for arthralgias.   Skin: Negative for rash.   Neurological: Positive for numbness.   Psychiatric/Behavioral: Negative for sleep disturbance.       Objective:      Physical Exam   Constitutional: She is oriented to person, place, and time. She appears well-developed and well-nourished.   HENT:   Head: Normocephalic and atraumatic.   Cardiovascular: Normal rate, regular rhythm and normal heart sounds.  Exam reveals no gallop and no friction rub.    No murmur heard.  Pulses:       Dorsalis pedis pulses are 2+ on the right side, and 2+ on the left side.   Pulmonary/Chest: Effort normal and breath sounds normal. No respiratory distress. She has no wheezes. She has no rales. She exhibits no tenderness.   Feet:   Right Foot:   Protective Sensation: 4 sites tested. 4 sites sensed.   Left Foot:   Protective Sensation: 4 sites tested. 4 sites sensed.   Neurological: She is alert and oriented to person, place, and time.   Skin: Skin is warm and dry. No erythema.   Psychiatric: She has a normal mood and affect.       Assessment:       1. Myasthenia gravis    2. Steroid-induced diabetes    3. Hypertension associated with  diabetes    4. Mixed hyperlipidemia    5. Urinary frequency        Plan:       Vicky was seen today for diabetes.    Diagnoses and all orders for this visit:    Myasthenia gravis - well treated right now.  Refilling imuran to get the pill counts better  -     azathioprine (IMURAN) 50 mg Tab; Take 2 tablets (100 mg total) by mouth 2 (two) times daily.    Steroid-induced diabetes - time for eye screening and lab work.  Refilling metformin for her.  stable  -     metformin (GLUCOPHAGE) 1000 MG tablet; Take 1 tablet (1,000 mg total) by mouth 2 (two) times daily with meals.  -     Diabetic Eye Screening Photo; Future  -     Comprehensive metabolic panel; Future  -     Hemoglobin A1c; Future    Hypertension associated with diabetes - stable at goal. Stay the course  -     Comprehensive metabolic panel; Future    Mixed hyperlipidemia    Urinary frequency - new complaint. Likely d/t steroids.  Urinalysis to r/o infection  -     Urinalysis    rtc prn, or 3 months    G Jake Razo MD MPH  Staff Internist

## 2017-09-20 DIAGNOSIS — G70.00 MYASTHENIA GRAVIS: ICD-10-CM

## 2017-09-20 DIAGNOSIS — G70.00 MYASTHENIA GRAVIS WITHOUT EXACERBATION: ICD-10-CM

## 2017-09-20 RX ORDER — PREDNISONE 10 MG/1
10 TABLET ORAL DAILY
Qty: 30 TABLET | Refills: 5 | Status: SHIPPED | OUTPATIENT
Start: 2017-09-20 | End: 2018-03-23 | Stop reason: SDUPTHER

## 2017-09-22 DIAGNOSIS — E11.9 TYPE 2 DIABETES MELLITUS WITHOUT COMPLICATION: ICD-10-CM

## 2017-09-25 ENCOUNTER — TELEPHONE (OUTPATIENT)
Dept: NEUROLOGY | Facility: CLINIC | Age: 37
End: 2017-09-25

## 2017-09-25 NOTE — TELEPHONE ENCOUNTER
----- Message from Homero Shelton sent at 9/25/2017  8:12 AM CDT -----  Contact: Pt. [phone]  The patient would like to speak to someone regarding a letter regarding her condition. She's need it for her upcoming court hearing. Please contact the patient to discuss further.

## 2017-09-25 NOTE — TELEPHONE ENCOUNTER
Hearing to keep Disability Benefits. Need detail diagnosis and condition. Hearing scheduled next week on 10/5/17. Patient stated will  letter on Thursday, 9/27/17.

## 2017-09-26 DIAGNOSIS — G70.00 MYASTHENIA GRAVIS WITHOUT EXACERBATION: ICD-10-CM

## 2017-09-26 RX ORDER — AZATHIOPRINE 50 MG/1
TABLET ORAL
Qty: 180 TABLET | Refills: 3 | Status: SHIPPED | OUTPATIENT
Start: 2017-09-26 | End: 2017-12-14

## 2017-09-28 ENCOUNTER — OFFICE VISIT (OUTPATIENT)
Dept: NEUROLOGY | Facility: CLINIC | Age: 37
End: 2017-09-28
Payer: MEDICARE

## 2017-09-28 VITALS
HEART RATE: 72 BPM | DIASTOLIC BLOOD PRESSURE: 76 MMHG | WEIGHT: 186.31 LBS | BODY MASS INDEX: 29.94 KG/M2 | SYSTOLIC BLOOD PRESSURE: 120 MMHG | HEIGHT: 66 IN

## 2017-09-28 DIAGNOSIS — G70.00 MG (MYASTHENIA GRAVIS): Primary | ICD-10-CM

## 2017-09-28 DIAGNOSIS — M54.9 BACK PAIN, UNSPECIFIED BACK LOCATION, UNSPECIFIED BACK PAIN LATERALITY, UNSPECIFIED CHRONICITY: ICD-10-CM

## 2017-09-28 DIAGNOSIS — G89.29 NECK PAIN, CHRONIC: ICD-10-CM

## 2017-09-28 DIAGNOSIS — M54.2 NECK PAIN, CHRONIC: ICD-10-CM

## 2017-09-28 PROCEDURE — 99213 OFFICE O/P EST LOW 20 MIN: CPT | Mod: PBBFAC | Performed by: PHYSICIAN ASSISTANT

## 2017-09-28 PROCEDURE — 99999 PR PBB SHADOW E&M-EST. PATIENT-LVL III: CPT | Mod: PBBFAC,,, | Performed by: PHYSICIAN ASSISTANT

## 2017-09-28 PROCEDURE — 99214 OFFICE O/P EST MOD 30 MIN: CPT | Mod: S$PBB,,, | Performed by: PHYSICIAN ASSISTANT

## 2017-09-28 NOTE — LETTER
September 28, 2017      Nirmal Razo II, MD  [address]  [place] [address]           [facility] Neurology  [address]  [place] [address]  Phone: [phone]  Fax: [phone]          Patient: Vicky Joy   MR Number: [id number]   YOB: 1980   Date of Visit: 9/28/2017       Dear Dr. Nirmal Razo II:    Thank you for referring Vicky Joy to me for evaluation. Attached you will find relevant portions of my assessment and plan of care.    If you have questions, please do not hesitate to call me. I look forward to following Vicky Joy along with you.    Sincerely,    Mable Madden PA-C    Enclosure  CC:  No Recipients    If you would like to receive this communication electronically, please contact [email or web address] or [phone] to request more information on [facility] access.    For providers and/or their staff who would like to refer a patient to [facility], please contact us through our one-stop-shop provider referral line, [facility], at [phone].    If you feel you have received this communication in error or would no longer like to receive these types of communications, please e-mail [email or web address]

## 2017-09-28 NOTE — PROGRESS NOTES
[facility], Department of Neurology   [facility] [place] [address]  Phone:[phone]  Fax: [phone]    Patient Name: Vicky Joy  : 1980  MRN:  [id number]    2017     chief complaint: myasthenia gravis     PCP: Nirmal Razo Ii, MD.    DX: Myasthenia gravis  Thymic status: Thymectomy 2011  Maintenance: Imuran 100mg/50mg since 2015  Pred 20mg/d -> 15mg in May of 2015 --> 11 mg 2016   Mestinon 30mg q4, Timespan 180mg qAM  Rescue: PLEX; never had IVIG  Prior immunemodulatory agents: cellcept    Interval hx 17:  Vicky Joy is a 36 yo female with generalized MG dx  s/p thymectomy .   Currently on Prednisone 10 mg qd, Imuran 100/100 mg, Timespan 180 mg qAM, and Mestinon 30 mg q4. Last visit was given PT script given for chronic neck and back pain- pt has not yet completed but does still have pain. From an MG perspective, she notes intermittent diplopia and ptosis. Otherwise, MG fairly well-controlled at this time. She requests letter stating her condition as she has upcoming court hearing for disability.       Interval hx 3/16/17:  Vicky Joy is a 35 yo female with MG dx , s/p thymectomy .     Currently on Prednisone 10 mg qd, Imuran 100/100 mg, Timespan 180 mg qAM, and Mestinon 30 mg q4.     Notes SULLIVAN. No difficulty chewing/swallowing. Intermittent ptosis and diplopia which usually lasts a few minutes. Also notes watery eyes.     Notes chronic neck and back pain. Also pain in legs. Describes as intermittent aching. Worse with activity. PCP prescribed Gabapentin, has not helped. Hasn't done PT.     Also notes chronic chest pain since thymectomy. Occurs about every other day, usually lasts a few hrs. Takes Tylenol with minor relief.     Interval hx 16:  Vicky Joy is a 35 yo female with generalized MG dx'ed in , s/p thymectomy in . Symptoms are stable from last visit. Still has good and  "bad days. Has some occasional SULLIVAN with walking, takes a break and can keep going. Arms and legs intermittently weak. Diplopia has improved. Has wisdom tooth with pain, eating soft food, no problems chewing/swallowing     Interval hx 10/7/16:  Vicky Joy is a 37 yo female with generalized MG dx'ed in 2010, s/p thymectomy in 2011. Has been about 9 mos since last clinic visit. She states she has been doing fairly well since last being seen. However, also states she has some bad days which occur a few times a week, on these days she feels tired, lays in bed, "doesn't feel well", also states feels weaker in arms and legs. Has intermittent diplopia about once per month. Also complains of increased SULLIVAN over the past few weeks, occurs when walking. +weight gain 2/2 prednisone. Denies difficulty chewing or swallowing. DM currently well-controlled.    Current medications include:  Prednisone 11 mg daily (last visit was decreased from 15 mg to 12, has been taking 11 mg)   Imuran 100/50   Timespan 180 qam   Mestinon 30 q 4x/day     Interval 1/13/16: Doing well. Occasional ptosis but no diplopia. No dysarthria or dysphagia.     Interval 9/18/15: Doing about the same, to a little weaker in her shoulders. No other new symptoms.     Interval 5/8/15: Now taking imuran 100mg bid (she weighs 86kg, so just over 2mg/kg). Doing well from MG standpoint.     Interval 2/6/15: Missed appointment with me in Nov. Returns today. MG stable, but having back pain. Last bone scans in July 2014.      Was supposed to be on imuran 100mg bid, but only taking it 50mg bid. Comes on bottle as 50mg bid, despite my writing it otherwise.      Interval 10/7/14: Doing about the same. Still with end of the day diplopia and ptosis. No chewing or swallowing difficulties. Strength is good.      HPI: Vicky Joy is a 35 y.o. female with generalized MG who returns today for f/u. She increased her imuran as I instructed her to do. Her disease " burden is better.    HPI 2/18/13: Vicky Joy is a 32 y.o. female with a diagnosis of generalized MG diagnosed in 2010 after presenting with difficulty chewing, swallowing and generalized weakness. She presented at that time in crisis and was intubated and admitted to a critical care unit, where the diagnosis was made.      She has been on prednisone and mestinon since her diagnosis, and is currently on 25mg/day. She has not had any subsequent hospitalizations, but has had several rounds of PLEX as an outpatient. Her last exchange was (she thinks) was in 2012.      She has diplopia and generalized weakness at baseline, but in the last few weeks, she has had increasing lower extremity weakness and slightly more difficulty swallowing.     She had a thymectomy in the fall of 2011.     Medications:   Current Outpatient Prescriptions   Medication Sig Dispense Refill    azathioprine (IMURAN) 50 mg Tab Take 2 tablets (100 mg total) by mouth 2 (two) times daily. 360 tablet 3    azathioprine (IMURAN) 50 mg Tab TAKE 1 TABLET BY MOUTH TWICE A  tablet 3    blood sugar diagnostic Strp 1 strip by Misc.(Non-Drug; Combo Route) route 2 (two) times daily before meals. 50 strip 3    glipiZIDE (GLUCOTROL) 10 MG tablet TAKE 1 TABLET BY MOUTH DAILY WITH BREAKFAST 90 tablet 3    lancets (BD ULTRA FINE LANCETS) 33 gauge Misc 1 lancet by Misc.(Non-Drug; Combo Route) route 2 (two) times daily before meals. 50 each 3    lisinopril (PRINIVIL,ZESTRIL) 20 MG tablet TAKE 1 TABLET (20 MG TOTAL) BY MOUTH ONCE DAILY. 90 tablet 3    meloxicam (MOBIC) 7.5 MG tablet TAKE 1 TABLET (7.5 MG TOTAL) BY MOUTH ONCE DAILY. 90 tablet 3    metformin (GLUCOPHAGE) 1000 MG tablet Take 1 tablet (1,000 mg total) by mouth 2 (two) times daily with meals. 180 tablet 3    nystatin (MYCOSTATIN) cream Apply topically 2 (two) times daily. FOR YEAST INFECTION UNDER BREASTS 60 g 3    omeprazole (PRILOSEC) 20 MG capsule TAKE 1 CAPSULE BY MOUTH ONCE DAILY. 90  capsule 2    predniSONE (DELTASONE) 10 MG tablet TAKE 1 TABLET (10 MG TOTAL) BY MOUTH ONCE DAILY. 30 tablet 5    pyridostigmine (MESTINON) 180 mg TbSR Take 1 tablet (180 mg total) by mouth once daily. 30 tablet 5    pyridostigmine (MESTINON) 60 mg Tab TAKE 1/2 TABLET BY MOUTH EVERY 4 HOURS 120 tablet 5    rosuvastatin (CRESTOR) 20 MG tablet TAKE 1 TABLET (20 MG TOTAL) BY MOUTH ONCE DAILY. 90 tablet 3    vitamin D 1000 units Tab Take 185 mg by mouth once daily.      calcium carbonate (OS-MARISOL) 500 mg calcium (1,250 mg) tablet Take 1 tablet (500 mg total) by mouth 2 (two) times daily. 180 tablet 3     No current facility-administered medications for this visit.        Allergies:  Review of patient's allergies indicates:   Allergen Reactions    Cellcept [mycophenolate mofetil]      Weakness (muscle)^       PMHx:  Past Medical History:   Diagnosis Date    Diabetes mellitus type II     Headache(784.0)     Hypertension     Myasthenia gravis without exacerbation     Obesity     Other and unspecified hyperlipidemia      Past Surgical History:   Procedure Laterality Date    SPINE SURGERY      THYMECTOMY         Fhx:  Family History   Problem Relation Age of Onset    Cancer Mother      breast cancer survivor    Diabetes Father        Shx:   Social History     Social History    Marital status: Single     Spouse name: N/A    Number of children: N/A    Years of education: N/A     Occupational History    Not on file.     Social History Main Topics    Smoking status: Never Smoker    Smokeless tobacco: Never Used    Alcohol use No    Drug use: No    Sexual activity: No     Other Topics Concern    Not on file     Social History Narrative    No narrative on file       ROS:  Review of Systems (Questions asked; positives or additions noted in BOLD)  Gen Malaise/fatigue, weight change   HEENT Hearing loss, blurred vision, diplopia (intermittent)   Card CP, palpitations   Pulm SOB, cough    Vasc Easy bruising,  "easy bleeding    GI Nausea, vomiting, constipation    Frequency, urgency   M/S Joint pain, myalgias, falls    Endocrine Temperature intolerance, polyuria, polydipsia   Neuro Dizziness, tremors, seizures, focal weakness, Others- as noted in HPI   Psych Memory loss, depression, anxiety, hallucinations     PHYSICAL EXAM:   /76   Pulse 72   Ht 5' 6" (1.676 m)   Wt 84.5 kg (186 lb 4.6 oz)   BMI 30.07 kg/m²    GEN:  NAD, well-developed, well-groomed.  HEENT: No cervical lymphadenopathy noted.  CARDIOVASCULAR: Radial pulses 2+ bilaterally. No LE edema noted.  NEURO:  Mental Status: Awake, alert, and oriented. Normal attention and concentration.  Speech is fluent and appropriate language function.    Cranial Nerves:  II Pupils are round and reactive to direct and consensual light and accommodation. Visual fields full to confrontation.   III, IV, VI Extraocular eye movements full bilaterally. No ptosis noted.   V Normal facial sensation to pinprick and light touch.   VII Symmetric facial expressions.   VIII Hearing intact to finger rub bilaterally.   IX, X Palate elevates midline and symmetrically.   XI Shoulder elevation is symmetric and full strength bilaterally. Neck rotation strength is normal bilaterally.   XII Tongue is midline.     Sensory: Sensation is normal to light touch in the upper and lower extremities bilaterally. Romberg is Negative.    Motor: Extremities demonstrate normal bulk and tone in all four limbs. No atrophy or fasciculations noted.   Strength-       RUE: 5/5                LUE: 5/5                RLE: 5/5                LLE: 5/5     Neck flexion/extension: 5/5  Good orbicularis oris strength.Good orbicularis oculi strength  No SOB noted during exam.    Deep Tendon Relfexes: 2+ and symmetric in the upper and lower extremities bilaterally. Babinski mute bilaterally.    Coordination: Finger to nose WNL.     Gait: Normal casual gait.    ASSESSMENT:     ICD-10-CM ICD-9-CM   1. MG (myasthenia " gravis) G70.00 358.00   2. Neck pain, chronic M54.2 723.1    G89.29 338.29   3. Back pain, unspecified back location, unspecified back pain laterality, unspecified chronicity M54.9 724.5       PLAN:  Orders Placed This Encounter    CBC auto differential    Hepatic function panel    Ambulatory consult to Physical Therapy     Orders Placed This Encounter   Procedures    CBC auto differential    Hepatic function panel    Ambulatory consult to Physical Therapy     37 yo female with generalized MG. Currently notes intermittent diplopia and ptosis. She has chronic neck and back pain and has not yet completed PT.     -labs today   -can try alternate day dosing of Prednisone-- 20 mg qOD   -continue other meds as prescribed   -given printed script for PT so she can go somewhere near the home  -pt given letter stating current condition   -continue following with PCP for management of possible SE of prednisone   -RTC 3 mos        The patient indicates understanding of these issues and agrees to the plan.      Attending, Dr. Castellon, was available during today's encounter. Any change to plan along with cosign to appear in the EMR.       This document has been electronically signed by Mable Madden PA-C on 9/28/2017, 10:56 AM. I have personally typed this message using the EMR.

## 2017-12-14 ENCOUNTER — LAB VISIT (OUTPATIENT)
Dept: LAB | Facility: HOSPITAL | Age: 37
End: 2017-12-14
Attending: INTERNAL MEDICINE
Payer: MEDICARE

## 2017-12-14 ENCOUNTER — CLINICAL SUPPORT (OUTPATIENT)
Dept: OPTOMETRY | Facility: CLINIC | Age: 37
End: 2017-12-14
Attending: INTERNAL MEDICINE
Payer: MEDICARE

## 2017-12-14 ENCOUNTER — TELEPHONE (OUTPATIENT)
Dept: NEUROLOGY | Facility: CLINIC | Age: 37
End: 2017-12-14

## 2017-12-14 ENCOUNTER — OFFICE VISIT (OUTPATIENT)
Dept: INTERNAL MEDICINE | Facility: CLINIC | Age: 37
End: 2017-12-14
Payer: MEDICARE

## 2017-12-14 VITALS
DIASTOLIC BLOOD PRESSURE: 80 MMHG | HEIGHT: 66 IN | HEART RATE: 65 BPM | SYSTOLIC BLOOD PRESSURE: 110 MMHG | BODY MASS INDEX: 29.05 KG/M2 | WEIGHT: 180.75 LBS

## 2017-12-14 DIAGNOSIS — E11.40: ICD-10-CM

## 2017-12-14 DIAGNOSIS — T38.0X5A STEROID-INDUCED DIABETES: ICD-10-CM

## 2017-12-14 DIAGNOSIS — E11.59 HYPERTENSION ASSOCIATED WITH DIABETES: ICD-10-CM

## 2017-12-14 DIAGNOSIS — E09.9 STEROID-INDUCED DIABETES: ICD-10-CM

## 2017-12-14 DIAGNOSIS — E78.2 MIXED HYPERLIPIDEMIA: ICD-10-CM

## 2017-12-14 DIAGNOSIS — G70.00 MG (MYASTHENIA GRAVIS): ICD-10-CM

## 2017-12-14 DIAGNOSIS — I15.2 HYPERTENSION ASSOCIATED WITH DIABETES: ICD-10-CM

## 2017-12-14 DIAGNOSIS — E11.40: Primary | ICD-10-CM

## 2017-12-14 LAB
ALBUMIN SERPL BCP-MCNC: 3.5 G/DL
ALP SERPL-CCNC: 133 U/L
ALT SERPL W/O P-5'-P-CCNC: 68 U/L
ANION GAP SERPL CALC-SCNC: 7 MMOL/L
AST SERPL-CCNC: 39 U/L
BILIRUB SERPL-MCNC: 0.7 MG/DL
BUN SERPL-MCNC: 11 MG/DL
CALCIUM SERPL-MCNC: 9.9 MG/DL
CHLORIDE SERPL-SCNC: 108 MMOL/L
CHOLEST SERPL-MCNC: 141 MG/DL
CHOLEST/HDLC SERPL: 2.5 {RATIO}
CO2 SERPL-SCNC: 27 MMOL/L
CREAT SERPL-MCNC: 0.8 MG/DL
EST. GFR  (AFRICAN AMERICAN): >60 ML/MIN/1.73 M^2
EST. GFR  (NON AFRICAN AMERICAN): >60 ML/MIN/1.73 M^2
ESTIMATED AVG GLUCOSE: 143 MG/DL
GLUCOSE SERPL-MCNC: 57 MG/DL
HBA1C MFR BLD HPLC: 6.6 %
HDLC SERPL-MCNC: 56 MG/DL
HDLC SERPL: 39.7 %
LDLC SERPL CALC-MCNC: 73 MG/DL
NONHDLC SERPL-MCNC: 85 MG/DL
POTASSIUM SERPL-SCNC: 3.9 MMOL/L
PROT SERPL-MCNC: 7.8 G/DL
SODIUM SERPL-SCNC: 142 MMOL/L
TRIGL SERPL-MCNC: 60 MG/DL
TSH SERPL DL<=0.005 MIU/L-ACNC: 0.48 UIU/ML

## 2017-12-14 PROCEDURE — 80061 LIPID PANEL: CPT

## 2017-12-14 PROCEDURE — 83036 HEMOGLOBIN GLYCOSYLATED A1C: CPT

## 2017-12-14 PROCEDURE — 99999 PR PBB SHADOW E&M-EST. PATIENT-LVL III: CPT | Mod: PBBFAC,,, | Performed by: INTERNAL MEDICINE

## 2017-12-14 PROCEDURE — 99213 OFFICE O/P EST LOW 20 MIN: CPT | Mod: PBBFAC,25 | Performed by: INTERNAL MEDICINE

## 2017-12-14 PROCEDURE — 92250 FUNDUS PHOTOGRAPHY W/I&R: CPT | Mod: 50,PBBFAC

## 2017-12-14 PROCEDURE — 99999 PR PBB SHADOW E&M-EST. PATIENT-LVL II: CPT | Mod: PBBFAC,,,

## 2017-12-14 PROCEDURE — 80053 COMPREHEN METABOLIC PANEL: CPT

## 2017-12-14 PROCEDURE — 92250 FUNDUS PHOTOGRAPHY W/I&R: CPT | Mod: 26,S$PBB,, | Performed by: OPHTHALMOLOGY

## 2017-12-14 PROCEDURE — 84443 ASSAY THYROID STIM HORMONE: CPT

## 2017-12-14 PROCEDURE — 99212 OFFICE O/P EST SF 10 MIN: CPT | Mod: PBBFAC,27

## 2017-12-14 PROCEDURE — 99214 OFFICE O/P EST MOD 30 MIN: CPT | Mod: S$PBB,,, | Performed by: INTERNAL MEDICINE

## 2017-12-14 RX ORDER — GABAPENTIN 100 MG/1
100 CAPSULE ORAL 3 TIMES DAILY
Qty: 270 CAPSULE | Refills: 3 | Status: SHIPPED | OUTPATIENT
Start: 2017-12-14 | End: 2018-11-09 | Stop reason: SDUPTHER

## 2017-12-14 NOTE — PROGRESS NOTES
Subjective:       Patient ID: Vicky Joy is a 37 y.o. female.    Chief Complaint: Leg Pain    HPI - Ms Joy has had progressively worsening burning and tingling pain in her left thigh to her foot for the past week.  She thinks one calf is swollen. Burning pain is the worst part, rated 10/10 now.  Worse at night.  Never had this before.      Prednisone dose now up to 10bid for myasthenia.  Not taking home blood sugars.  Due for lipid panel.  Taking antihypertensives as prescribed.  She is not a smoker    PMH:  Myasthenia on steroids long term  Steroid induced DM  HTN  Fibromyalgia     Meds:  Reviewed and reconciled in EPIC with patient during visit today.    Review of Systems   Constitutional: Negative for fatigue.   HENT: Negative for congestion.    Respiratory: Negative for shortness of breath.    Cardiovascular: Negative for chest pain.   Gastrointestinal: Negative for abdominal pain.   Genitourinary: Negative for difficulty urinating.   Musculoskeletal: Positive for myalgias.   Skin: Negative for rash.   Neurological: Negative for headaches.   Psychiatric/Behavioral: Negative for suicidal ideas.       Objective:      Physical Exam   Constitutional: She is oriented to person, place, and time. She appears well-developed and well-nourished.   HENT:   Head: Normocephalic and atraumatic.   Cardiovascular: Normal rate, regular rhythm and normal heart sounds.  Exam reveals no gallop and no friction rub.    No murmur heard.  Pulmonary/Chest: Effort normal and breath sounds normal. No respiratory distress. She has no wheezes. She has no rales. She exhibits no tenderness.   Neurological: She is alert and oriented to person, place, and time.   Left LE exquisitely tender to palpation from knee to toes.  No rash, no swelling.  Normal pulses.    Right LE normal exam.     Skin: Skin is warm and dry. No erythema.   Psychiatric: She has a normal mood and affect.   Nursing note and vitals reviewed.      Assessment:        1. Acute painful diabetic neuropathy    2. MG (myasthenia gravis)    3. Mixed hyperlipidemia    4. Steroid-induced diabetes    5. Hypertension associated with diabetes        Plan:       Vicky was seen today for leg pain.    Diagnoses and all orders for this visit:    Acute painful diabetic neuropathy - I'm not sure exactly what this is - unusual for diabetic neuropathy, but will try gabapentin.  Asking neurology for assistance  -     Ambulatory consult to Neurology  -     gabapentin (NEURONTIN) 100 MG capsule; Take 1 capsule (100 mg total) by mouth 3 (three) times daily.  -     Vitamin B12 Deficiency Panel; Future  -     TSH; Future    MG (myasthenia gravis) - stable, but requiring more medication over time    Mixed hyperlipidemia -s table, time for blood work  -     Lipid panel; Future    Steroid-induced diabetes - stable, at goal.  Time for repeat blood work  -     Hemoglobin A1c; Future  -     Diabetic Eye Screening Photo; Future  -     blood sugar diagnostic Strp; 1 strip by Misc.(Non-Drug; Combo Route) route 2 (two) times daily before meals.    Hypertension associated with diabetes - at goal, stay the course  -     Comprehensive metabolic panel; Future    rtc prn.  Neurology today    LEE Razo MD MPH  Staff Internist

## 2017-12-14 NOTE — TELEPHONE ENCOUNTER
----- Message from Vince Carlton sent at 12/14/2017 10:44 AM CST -----  Contact: Devan (Dr. Razo's office) @ ext [phone]  Devan rescheduled appt on 03/02/18 w/ Dr. Angel to Dr. Muhammad on 01/15/18, due to pain from neuropathy. Dr. Razo is asking the pt been seen asap, either this week or next. Pls call Devan directly if pt can be seen.

## 2017-12-14 NOTE — PROGRESS NOTES
HPI     Diabetic Eye Exam    Additional comments: photos           Comments   37 y.o. y/o here for screening for Diabetic Renopathy with non-dilated   fundus photos per Nirmal Razo Ii, MD    Small pupils        Last edited by Glenda Robledo MA on 12/14/2017 11:21 AM. (History)              Assessment /Plan     For exam results, see Encounter Report.    Steroid-induced diabetes  -     Diabetic Eye Screening Photo

## 2017-12-15 LAB — VIT B12 SERPL-MCNC: 897 NG/L

## 2017-12-19 ENCOUNTER — TELEPHONE (OUTPATIENT)
Dept: OPTOMETRY | Facility: CLINIC | Age: 37
End: 2017-12-19

## 2017-12-19 ENCOUNTER — PATIENT MESSAGE (OUTPATIENT)
Dept: OPTOMETRY | Facility: CLINIC | Age: 37
End: 2017-12-19

## 2017-12-19 NOTE — TELEPHONE ENCOUNTER
Called pt regarding scheduling an over due eye examination ( DFE) got voice recorder left message to call office @ [phone] or [phone].  Also sending message through [facility].

## 2017-12-19 NOTE — TELEPHONE ENCOUNTER
----- Message from Shannon Denis OD sent at 12/15/2017  8:52 AM CST -----  Pt due for annual DFE   Please call her to schedule   Thanks    ----- Message -----  From: Erik Mcdaniel MD  Sent: 12/15/2017   8:49 AM  To: Adeel Thomas

## 2018-02-20 RX ORDER — OMEPRAZOLE 20 MG/1
CAPSULE, DELAYED RELEASE ORAL
Qty: 90 CAPSULE | Refills: 2 | Status: SHIPPED | OUTPATIENT
Start: 2018-02-20 | End: 2018-11-25 | Stop reason: SDUPTHER

## 2018-03-23 DIAGNOSIS — G70.00 MYASTHENIA GRAVIS WITHOUT EXACERBATION: ICD-10-CM

## 2018-03-23 DIAGNOSIS — G70.00 MYASTHENIA GRAVIS: ICD-10-CM

## 2018-03-26 RX ORDER — PYRIDOSTIGMINE BROMIDE 180 MG/1
180 TABLET, EXTENDED RELEASE ORAL DAILY
Qty: 30 TABLET | Refills: 5 | Status: SHIPPED | OUTPATIENT
Start: 2018-03-26 | End: 2018-09-25 | Stop reason: SDUPTHER

## 2018-03-26 RX ORDER — PREDNISONE 10 MG/1
10 TABLET ORAL DAILY
Qty: 30 TABLET | Refills: 5 | Status: SHIPPED | OUTPATIENT
Start: 2018-03-26 | End: 2018-08-07 | Stop reason: SDUPTHER

## 2018-07-16 DIAGNOSIS — E11.9 TYPE 2 DIABETES MELLITUS WITHOUT COMPLICATION: ICD-10-CM

## 2018-08-07 ENCOUNTER — LAB VISIT (OUTPATIENT)
Dept: LAB | Facility: HOSPITAL | Age: 38
End: 2018-08-07
Attending: PSYCHIATRY & NEUROLOGY
Payer: MEDICARE

## 2018-08-07 ENCOUNTER — OFFICE VISIT (OUTPATIENT)
Dept: NEUROLOGY | Facility: CLINIC | Age: 38
End: 2018-08-07
Payer: MEDICARE

## 2018-08-07 VITALS
HEIGHT: 66 IN | SYSTOLIC BLOOD PRESSURE: 118 MMHG | BODY MASS INDEX: 28.56 KG/M2 | HEART RATE: 60 BPM | DIASTOLIC BLOOD PRESSURE: 83 MMHG | WEIGHT: 177.69 LBS

## 2018-08-07 DIAGNOSIS — G70.00 MYASTHENIA GRAVIS WITHOUT EXACERBATION: ICD-10-CM

## 2018-08-07 DIAGNOSIS — G70.00 MYASTHENIA GRAVIS: ICD-10-CM

## 2018-08-07 DIAGNOSIS — G70.00 MG (MYASTHENIA GRAVIS): ICD-10-CM

## 2018-08-07 LAB
ALBUMIN SERPL BCP-MCNC: 3.7 G/DL
ALP SERPL-CCNC: 132 U/L
ALT SERPL W/O P-5'-P-CCNC: 22 U/L
AST SERPL-CCNC: 16 U/L
BASOPHILS # BLD AUTO: 0.06 K/UL
BASOPHILS NFR BLD: 0.5 %
BILIRUB DIRECT SERPL-MCNC: 0.3 MG/DL
BILIRUB SERPL-MCNC: 0.7 MG/DL
DIFFERENTIAL METHOD: ABNORMAL
EOSINOPHIL # BLD AUTO: 0 K/UL
EOSINOPHIL NFR BLD: 0.3 %
ERYTHROCYTE [DISTWIDTH] IN BLOOD BY AUTOMATED COUNT: 16.5 %
HCT VFR BLD AUTO: 40.6 %
HGB BLD-MCNC: 13.1 G/DL
IMM GRANULOCYTES # BLD AUTO: 0.04 K/UL
IMM GRANULOCYTES NFR BLD AUTO: 0.3 %
LYMPHOCYTES # BLD AUTO: 1.8 K/UL
LYMPHOCYTES NFR BLD: 15.7 %
MCH RBC QN AUTO: 30.9 PG
MCHC RBC AUTO-ENTMCNC: 32.3 G/DL
MCV RBC AUTO: 96 FL
MONOCYTES # BLD AUTO: 0.8 K/UL
MONOCYTES NFR BLD: 6.9 %
NEUTROPHILS # BLD AUTO: 8.9 K/UL
NEUTROPHILS NFR BLD: 76.3 %
NRBC BLD-RTO: 0 /100 WBC
PLATELET # BLD AUTO: 278 K/UL
PMV BLD AUTO: 12.7 FL
PROT SERPL-MCNC: 7.1 G/DL
RBC # BLD AUTO: 4.24 M/UL
WBC # BLD AUTO: 11.69 K/UL

## 2018-08-07 PROCEDURE — 80076 HEPATIC FUNCTION PANEL: CPT

## 2018-08-07 PROCEDURE — 36415 COLL VENOUS BLD VENIPUNCTURE: CPT

## 2018-08-07 PROCEDURE — 99214 OFFICE O/P EST MOD 30 MIN: CPT | Mod: S$GLB,,, | Performed by: PSYCHIATRY & NEUROLOGY

## 2018-08-07 PROCEDURE — 85025 COMPLETE CBC W/AUTO DIFF WBC: CPT

## 2018-08-07 PROCEDURE — 99999 PR PBB SHADOW E&M-EST. PATIENT-LVL III: CPT | Mod: PBBFAC,,, | Performed by: PSYCHIATRY & NEUROLOGY

## 2018-08-07 RX ORDER — PREDNISONE 10 MG/1
20 TABLET ORAL DAILY
Qty: 180 TABLET | Refills: 3 | Status: SHIPPED | OUTPATIENT
Start: 2018-08-07 | End: 2020-10-30 | Stop reason: SDUPTHER

## 2018-08-07 NOTE — PROGRESS NOTES
[facility], Department of Neurology   [facility] [place] [address]  Phone:[phone]  Fax: [phone]    Patient Name: Vicky Joy  : 1980  MRN:  [id number]    2018     chief complaint: myasthenia gravis     PCP: Nirmal Razo Ii, MD.    DX: Myasthenia gravis  Thymic status: Thymectomy 2011  Maintenance: Imuran 100mg/50mg since 2015  Pred 20mg/d -> 15mg in May of 2015 --> 11 mg 2016; 20mg qOD in 2017  Mestinon 30mg q4, Timespan 180mg qAM  Rescue: PLEX; never had IVIG  Prior immunemodulatory agents: cellcept    Interval hx 18  Changed prednisone to 20mg qoD in , but taking 20mg in AM and 10mg in PM.  Imuran taking 100mg/d, despite instructions otherwise.    Still having diplopia, ptosis, generalized weakness.     Interval hx 17:  Vicky Joy is a 36 yo female with generalized MG dx  s/p thymectomy .   Currently on Prednisone 10 mg qd, Imuran 100/100 mg, Timespan 180 mg qAM, and Mestinon 30 mg q4. Last visit was given PT script given for chronic neck and back pain- pt has not yet completed but does still have pain. From an MG perspective, she notes intermittent diplopia and ptosis. Otherwise, MG fairly well-controlled at this time. She requests letter stating her condition as she has upcoming court hearing for disability.       Interval hx 3/16/17:  Vicky Joy is a 37 yo female with MG dx , s/p thymectomy .     Currently on Prednisone 10 mg qd, Imuran 100/100 mg, Timespan 180 mg qAM, and Mestinon 30 mg q4.     Notes SULLIVAN. No difficulty chewing/swallowing. Intermittent ptosis and diplopia which usually lasts a few minutes. Also notes watery eyes.     Notes chronic neck and back pain. Also pain in legs. Describes as intermittent aching. Worse with activity. PCP prescribed Gabapentin, has not helped. Hasn't done PT.     Also notes chronic chest pain since thymectomy. Occurs about every other day,  "usually lasts a few hrs. Takes Tylenol with minor relief.     Interval hx 12/16/16:  Vicky Joy is a 35 yo female with generalized MG dx'ed in 2010, s/p thymectomy in 2011. Symptoms are stable from last visit. Still has good and bad days. Has some occasional SULLIVAN with walking, takes a break and can keep going. Arms and legs intermittently weak. Diplopia has improved. Has wisdom tooth with pain, eating soft food, no problems chewing/swallowing     Interval hx 10/7/16:  Vicky Joy is a 35 yo female with generalized MG dx'ed in 2010, s/p thymectomy in 2011. Has been about 9 mos since last clinic visit. She states she has been doing fairly well since last being seen. However, also states she has some bad days which occur a few times a week, on these days she feels tired, lays in bed, "doesn't feel well", also states feels weaker in arms and legs. Has intermittent diplopia about once per month. Also complains of increased SULLIVAN over the past few weeks, occurs when walking. +weight gain 2/2 prednisone. Denies difficulty chewing or swallowing. DM currently well-controlled.    Current medications include:  Prednisone 11 mg daily (last visit was decreased from 15 mg to 12, has been taking 11 mg)   Imuran 100/50   Timespan 180 qam   Mestinon 30 q 4x/day     Interval 1/13/16: Doing well. Occasional ptosis but no diplopia. No dysarthria or dysphagia.     Interval 9/18/15: Doing about the same, to a little weaker in her shoulders. No other new symptoms.     Interval 5/8/15: Now taking imuran 100mg bid (she weighs 86kg, so just over 2mg/kg). Doing well from MG standpoint.     Interval 2/6/15: Missed appointment with me in Nov. Returns today. MG stable, but having back pain. Last bone scans in July 2014.      Was supposed to be on imuran 100mg bid, but only taking it 50mg bid. Comes on bottle as 50mg bid, despite my writing it otherwise.      Interval 10/7/14: Doing about the same. Still with end of the day " diplopia and ptosis. No chewing or swallowing difficulties. Strength is good.      HPI: Vicky Joy is a 35 y.o. female with generalized MG who returns today for f/u. She increased her imuran as I instructed her to do. Her disease burden is better.    HPI 2/18/13: Vicky Joy is a 32 y.o. female with a diagnosis of generalized MG diagnosed in 2010 after presenting with difficulty chewing, swallowing and generalized weakness. She presented at that time in crisis and was intubated and admitted to a critical care unit, where the diagnosis was made.      She has been on prednisone and mestinon since her diagnosis, and is currently on 25mg/day. She has not had any subsequent hospitalizations, but has had several rounds of PLEX as an outpatient. Her last exchange was (she thinks) was in 2012.      She has diplopia and generalized weakness at baseline, but in the last few weeks, she has had increasing lower extremity weakness and slightly more difficulty swallowing.     She had a thymectomy in the fall of 2011.     Medications:   Current Outpatient Prescriptions   Medication Sig Dispense Refill    azathioprine (IMURAN) 50 mg Tab Take 2 tablets (100 mg total) by mouth 2 (two) times daily. 360 tablet 3    blood sugar diagnostic Strp 1 strip by Misc.(Non-Drug; Combo Route) route 2 (two) times daily before meals. 100 strip 3    gabapentin (NEURONTIN) 100 MG capsule Take 1 capsule (100 mg total) by mouth 3 (three) times daily. 270 capsule 3    glipiZIDE (GLUCOTROL) 10 MG tablet TAKE 1 TABLET BY MOUTH DAILY WITH BREAKFAST 90 tablet 3    lancets (BD ULTRA FINE LANCETS) 33 gauge Misc 1 lancet by Misc.(Non-Drug; Combo Route) route 2 (two) times daily before meals. 50 each 3    lisinopril (PRINIVIL,ZESTRIL) 20 MG tablet TAKE 1 TABLET (20 MG TOTAL) BY MOUTH ONCE DAILY. 90 tablet 3    metformin (GLUCOPHAGE) 1000 MG tablet Take 1 tablet (1,000 mg total) by mouth 2 (two) times daily with meals. 180 tablet 3     nystatin (MYCOSTATIN) cream Apply topically 2 (two) times daily. FOR YEAST INFECTION UNDER BREASTS 60 g 3    omeprazole (PRILOSEC) 20 MG capsule TAKE 1 CAPSULE BY MOUTH ONCE DAILY. 90 capsule 2    predniSONE (DELTASONE) 10 MG tablet Take 2 tablets (20 mg total) by mouth once daily. 180 tablet 3    pyridostigmine (MESTINON) 180 mg TbSR TAKE 1 TABLET (180 MG TOTAL) BY MOUTH ONCE DAILY. 30 tablet 5    pyridostigmine (MESTINON) 60 mg Tab TAKE 1/2 TABLET BY MOUTH EVERY 4 HOURS 120 tablet 5    rosuvastatin (CRESTOR) 20 MG tablet TAKE 1 TABLET (20 MG TOTAL) BY MOUTH ONCE DAILY. 90 tablet 3    vitamin D 1000 units Tab Take 185 mg by mouth once daily.      calcium carbonate (OS-MARISOL) 500 mg calcium (1,250 mg) tablet Take 1 tablet (500 mg total) by mouth 2 (two) times daily. 180 tablet 3     No current facility-administered medications for this visit.        Allergies:  Review of patient's allergies indicates:   Allergen Reactions    Cellcept [mycophenolate mofetil]      Weakness (muscle)^       PMHx:  Past Medical History:   Diagnosis Date    Diabetes mellitus type II     Headache(784.0)     Hypertension     Myasthenia gravis without exacerbation     Obesity     Other and unspecified hyperlipidemia      Past Surgical History:   Procedure Laterality Date    SPINE SURGERY      THYMECTOMY         Fhx:  Family History   Problem Relation Age of Onset    Cancer Mother         breast cancer survivor    Diabetes Father        Shx:   Social History     Social History    Marital status: Single     Spouse name: N/A    Number of children: N/A    Years of education: N/A     Occupational History    Not on file.     Social History Main Topics    Smoking status: Never Smoker    Smokeless tobacco: Never Used    Alcohol use No    Drug use: No    Sexual activity: No     Other Topics Concern    Not on file     Social History Narrative    No narrative on file       ROS:  Review of Systems (Questions asked; positives  "or additions noted in BOLD)  Gen Malaise/fatigue, weight change   HEENT Hearing loss, blurred vision, diplopia (intermittent)   Card CP, palpitations   Pulm SOB, cough    Vasc Easy bruising, easy bleeding    GI Nausea, vomiting, constipation    Frequency, urgency   M/S Joint pain, myalgias, falls    Endocrine Temperature intolerance, polyuria, polydipsia   Neuro Dizziness, tremors, seizures, focal weakness, Others- as noted in HPI   Psych Memory loss, depression, anxiety, hallucinations     PHYSICAL EXAM:   /83   Pulse 60   Ht 5' 6" (1.676 m)   Wt 80.6 kg (177 lb 11.1 oz)   BMI 28.68 kg/m²    GEN:  NAD, well-developed, well-groomed.  HEENT: No cervical lymphadenopathy noted.  CARDIOVASCULAR: Radial pulses 2+ bilaterally. No LE edema noted.  NEURO:  Mental Status: Awake, alert, and oriented. Normal attention and concentration.  Speech is fluent and appropriate language function.    Cranial Nerves:  II Pupils are round and reactive to direct and consensual light and accommodation. Visual fields full to confrontation.   III, IV, VI Extraocular eye movements full bilaterally, but diplopia on down-gaze. Bilateral ptosis   V Normal facial sensation to pinprick and light touch.   VII Symmetric facial expressions.   VIII Hearing intact to finger rub bilaterally.   IX, X Palate elevates midline and symmetrically.   XI Shoulder elevation is symmetric and full strength bilaterally. Neck rotation strength is normal bilaterally.   XII Tongue is midline.     Sensory: Sensation is normal to light touch in the upper and lower extremities bilaterally. Romberg is Negative.    Motor: Extremities demonstrate normal bulk and tone in all four limbs. No atrophy or fasciculations noted.   Strength-       RUE: 5/5                LUE: 5/5                RLE: 5/5                LLE: 5/5     Neck flexion/extension: 5/5  Good orbicularis oris strength.Good orbicularis oculi strength  No SOB noted during exam.    Deep Tendon " Relfexes: 2+ and symmetric in the upper and lower extremities bilaterally. Babinski mute bilaterally.    Coordination: Finger to nose WNL.     Gait: Normal casual gait.    ASSESSMENT and PLAN:   Problem List Items Addressed This Visit     MG (myasthenia gravis)    Current Assessment & Plan     Decrease prednisone from 30mg/d to 20mg/d, all taken in the AM  Increase imuran from 100mg/d to 150mg/d divided 100mg in AM and 50mg in PM.   Written instructions provided.   Bloodwork today.  RTC 2 mos           Other Visit Diagnoses     Myasthenia gravis        Relevant Medications    predniSONE (DELTASONE) 10 MG tablet    Other Relevant Orders    CBC auto differential (Completed)    Hepatic function panel (Completed)    Myasthenia gravis without exacerbation        Relevant Medications    predniSONE (DELTASONE) 10 MG tablet

## 2018-08-07 NOTE — ASSESSMENT & PLAN NOTE
Decrease prednisone from 30mg/d to 20mg/d, all taken in the AM  Increase imuran from 100mg/d to 150mg/d divided 100mg in AM and 50mg in PM.   Written instructions provided.   Bloodwork today.  RTC 2 mos

## 2018-08-07 NOTE — PATIENT INSTRUCTIONS
Prednisone 10mg: take 2 pills in the morning. None at night.     Azathioprine 50mg: take 2 pills in the morning and one at night for 2 weeks. Then, take 2 pills in the morning and two pills at night.

## 2018-08-07 NOTE — LETTER
August 7, 2018      Nirmal Razo II, MD  [address]  [place] [address]           [facility]  [address]  [place] [address]  Phone: [phone]  Fax: [phone]          Patient: Vicky Joy   MR Number: [id number]   YOB: 1980   Date of Visit: 8/7/2018       Dear Dr. Nirmal Razo II:    Thank you for referring Vicky Joy to me for evaluation. Attached you will find relevant portions of my assessment and plan of care.    If you have questions, please do not hesitate to call me. I look forward to following Vicky Joy along with you.    Sincerely,    Brendan Castellon MD    Enclosure  CC:  No Recipients    If you would like to receive this communication electronically, please contact [email or web address] or [phone] to request more information on [facility] Link access.    For providers and/or their staff who would like to refer a patient to [facility], please contact us through our one-stop-shop provider referral line, [facility], at [phone].    If you feel you have received this communication in error or would no longer like to receive these types of communications, please e-mail [email or web address]

## 2018-08-20 ENCOUNTER — OFFICE VISIT (OUTPATIENT)
Dept: INTERNAL MEDICINE | Facility: CLINIC | Age: 38
End: 2018-08-20
Payer: MEDICARE

## 2018-08-20 VITALS
SYSTOLIC BLOOD PRESSURE: 90 MMHG | DIASTOLIC BLOOD PRESSURE: 52 MMHG | BODY MASS INDEX: 28.95 KG/M2 | WEIGHT: 180.13 LBS | HEIGHT: 66 IN

## 2018-08-20 DIAGNOSIS — E11.59 HYPERTENSION ASSOCIATED WITH DIABETES: ICD-10-CM

## 2018-08-20 DIAGNOSIS — T38.0X5A STEROID-INDUCED DIABETES: Primary | ICD-10-CM

## 2018-08-20 DIAGNOSIS — E09.9 STEROID-INDUCED DIABETES: Primary | ICD-10-CM

## 2018-08-20 DIAGNOSIS — I15.2 HYPERTENSION ASSOCIATED WITH DIABETES: ICD-10-CM

## 2018-08-20 DIAGNOSIS — G70.00 MG (MYASTHENIA GRAVIS): ICD-10-CM

## 2018-08-20 DIAGNOSIS — E78.2 MIXED HYPERLIPIDEMIA: ICD-10-CM

## 2018-08-20 PROCEDURE — 99214 OFFICE O/P EST MOD 30 MIN: CPT | Mod: S$GLB,,, | Performed by: INTERNAL MEDICINE

## 2018-08-20 PROCEDURE — 99999 PR PBB SHADOW E&M-EST. PATIENT-LVL III: CPT | Mod: PBBFAC,,, | Performed by: INTERNAL MEDICINE

## 2018-08-20 RX ORDER — LISINOPRIL 10 MG/1
10 TABLET ORAL DAILY
Qty: 90 TABLET | Refills: 3 | Status: SHIPPED | OUTPATIENT
Start: 2018-08-20 | End: 2018-08-23 | Stop reason: SDUPTHER

## 2018-08-20 NOTE — PROGRESS NOTES
Subjective:       Patient ID: Vicky Joy is a 38 y.o. female.    Chief Complaint: Follow-up (myasthenia gravis )    HPI - Ms Joy feels well today. BP was low, but she's asymptomatic.  Review of chart shows excellent control, with some low BPs, as far back as epic goes.  She's requiring more steroids for myasthenia now; feels OK - sometimes short of breath, but pretty steady.  Sees a neurologist for management of this.  She's taking her statin regularly.  She doesn't smoke cigarettes.    PMH:  Myasthenia on steroids long term  Steroid induced DM  HTN  Fibromyalgia     Meds:  Reviewed and reconciled in EPIC with patient during visit today.     Review of Systems   Constitutional: Negative for fever.   HENT: Negative for congestion.    Respiratory: Negative for shortness of breath.    Cardiovascular: Negative for chest pain.   Gastrointestinal: Negative for abdominal pain.   Genitourinary: Negative for difficulty urinating.   Musculoskeletal: Negative for arthralgias.   Skin: Negative for rash.   Neurological: Negative for headaches.   Psychiatric/Behavioral: Negative for sleep disturbance.       Objective:      Physical Exam   Constitutional: She is oriented to person, place, and time. She appears well-developed and well-nourished.   HENT:   Head: Normocephalic and atraumatic.   Cardiovascular: Normal rate, regular rhythm and normal heart sounds. Exam reveals no gallop and no friction rub.   No murmur heard.  Pulses:       Dorsalis pedis pulses are 2+ on the right side, and 2+ on the left side.   Pulmonary/Chest: Effort normal and breath sounds normal. No respiratory distress. She has no wheezes. She has no rales. She exhibits no tenderness.   Musculoskeletal:        Right foot: There is normal range of motion and no deformity.        Left foot: There is normal range of motion and no deformity.   Feet:   Right Foot:   Protective Sensation: 4 sites tested. 4 sites sensed.   Skin Integrity: Negative for  ulcer, blister or skin breakdown.   Left Foot:   Protective Sensation: 4 sites tested. 4 sites sensed.   Skin Integrity: Negative for ulcer, blister or skin breakdown.   Neurological: She is alert and oriented to person, place, and time.   Skin: Skin is warm and dry. No erythema.   Psychiatric: She has a normal mood and affect.   Nursing note and vitals reviewed.      Assessment:       1. Steroid-induced diabetes    2. Hypertension associated with diabetes    3. MG (myasthenia gravis)    4. Mixed hyperlipidemia        Plan:       Vicky was seen today for follow-up.    Diagnoses and all orders for this visit:    Steroid-induced diabetes - stable.  Has been well-controlled in the past.  Time for repeat a1c  -     Hemoglobin A1c; Future    Hypertension associated with diabetes - at goal, over-treated.  WIll reduce lisinopril dose to 10 daily  -     lisinopril 10 MG tablet; Take 1 tablet (10 mg total) by mouth once daily.    MG (myasthenia gravis)    Mixed hyperlipidemia - stable on a statin.  Stay the course    rtc prn, or in 6 months    LEE Razo MD MPH  Staff Internist

## 2018-08-23 DIAGNOSIS — E11.59 HYPERTENSION ASSOCIATED WITH DIABETES: ICD-10-CM

## 2018-08-23 DIAGNOSIS — I15.2 HYPERTENSION ASSOCIATED WITH DIABETES: ICD-10-CM

## 2018-08-23 RX ORDER — LISINOPRIL 20 MG/1
20 TABLET ORAL DAILY
Qty: 90 TABLET | Refills: 3 | OUTPATIENT
Start: 2018-08-23

## 2018-08-23 RX ORDER — GLIPIZIDE 10 MG/1
TABLET ORAL
Qty: 90 TABLET | Refills: 3 | Status: SHIPPED | OUTPATIENT
Start: 2018-08-23 | End: 2019-08-10 | Stop reason: SDUPTHER

## 2018-08-23 RX ORDER — LISINOPRIL 10 MG/1
10 TABLET ORAL DAILY
Qty: 90 TABLET | Refills: 3 | Status: SHIPPED | OUTPATIENT
Start: 2018-08-23 | End: 2019-11-17 | Stop reason: SDUPTHER

## 2018-08-25 DIAGNOSIS — T38.0X5A STEROID-INDUCED DIABETES: ICD-10-CM

## 2018-08-25 DIAGNOSIS — E09.9 STEROID-INDUCED DIABETES: ICD-10-CM

## 2018-08-25 DIAGNOSIS — E78.5 HYPERLIPIDEMIA, UNSPECIFIED HYPERLIPIDEMIA TYPE: ICD-10-CM

## 2018-08-26 RX ORDER — ROSUVASTATIN CALCIUM 20 MG/1
20 TABLET, COATED ORAL DAILY
Qty: 90 TABLET | Refills: 3 | Status: SHIPPED | OUTPATIENT
Start: 2018-08-26 | End: 2019-08-21 | Stop reason: SDUPTHER

## 2018-09-25 DIAGNOSIS — G70.00 MYASTHENIA GRAVIS WITHOUT EXACERBATION: ICD-10-CM

## 2018-09-25 DIAGNOSIS — G70.00 MYASTHENIA GRAVIS: ICD-10-CM

## 2018-09-25 RX ORDER — PYRIDOSTIGMINE BROMIDE 180 MG/1
180 TABLET, EXTENDED RELEASE ORAL DAILY
Qty: 30 TABLET | Refills: 5 | Status: SHIPPED | OUTPATIENT
Start: 2018-09-25 | End: 2019-03-29 | Stop reason: SDUPTHER

## 2018-10-16 ENCOUNTER — OFFICE VISIT (OUTPATIENT)
Dept: NEUROLOGY | Facility: CLINIC | Age: 38
End: 2018-10-16
Payer: MEDICARE

## 2018-10-16 VITALS
HEIGHT: 66 IN | DIASTOLIC BLOOD PRESSURE: 69 MMHG | HEART RATE: 66 BPM | BODY MASS INDEX: 29.41 KG/M2 | SYSTOLIC BLOOD PRESSURE: 102 MMHG | WEIGHT: 183 LBS

## 2018-10-16 DIAGNOSIS — G70.00 MYASTHENIA GRAVIS: Primary | ICD-10-CM

## 2018-10-16 DIAGNOSIS — G70.00 MG (MYASTHENIA GRAVIS): ICD-10-CM

## 2018-10-16 PROCEDURE — 99214 OFFICE O/P EST MOD 30 MIN: CPT | Mod: S$GLB,,, | Performed by: PSYCHIATRY & NEUROLOGY

## 2018-10-16 PROCEDURE — 99213 OFFICE O/P EST LOW 20 MIN: CPT | Mod: PBBFAC | Performed by: PSYCHIATRY & NEUROLOGY

## 2018-10-16 PROCEDURE — 99999 PR PBB SHADOW E&M-EST. PATIENT-LVL III: CPT | Mod: PBBFAC,,, | Performed by: PSYCHIATRY & NEUROLOGY

## 2018-10-16 NOTE — PROGRESS NOTES
[facility], Department of Neurology   [facility] [place] [address]  Phone:[phone]  Fax: [phone]    Patient Name: Vicky Joy  : 1980  MRN:  [id number]    10/16/2018     chief complaint: myasthenia gravis     PCP: Nirmal Razo Ii, MD.    DX: Myasthenia gravis  Thymic status: Thymectomy 2011  Maintenance:   Imuran 100mg/50mg since 2015  Pred 20mg/d   Mestinon 30mg q4, Timespan 180mg qAM    Rescue: PLEX; never had IVIG  Prior immunemodulatory agents: cellcept    Interval history 10/16/18:  Ms Joy returns today in follow up from August appointment. At that time, I asked that she reduce her prednisone from 20mg qAM/10mg qPM to 20mg qAM and to increase her azathioprine from 100mg/d to 100mg bid. She is taking these medications as directed.     Doing pretty well lately. Diplopia has quieted down.     Discussed flu shot. She is planning to get one.         Interval hx 18  Changed prednisone to 20mg qoD in , but taking 20mg in AM and 10mg in PM.  Imuran taking 100mg/d, despite instructions otherwise.    Still having diplopia, ptosis, generalized weakness.     Interval hx 17:  Vicky Joy is a 36 yo female with generalized MG dx  s/p thymectomy .   Currently on Prednisone 10 mg qd, Imuran 100/100 mg, Timespan 180 mg qAM, and Mestinon 30 mg q4. Last visit was given PT script given for chronic neck and back pain- pt has not yet completed but does still have pain. From an MG perspective, she notes intermittent diplopia and ptosis. Otherwise, MG fairly well-controlled at this time. She requests letter stating her condition as she has upcoming court hearing for disability.       Interval hx 3/16/17:  Vicky Joy is a 37 yo female with MG dx , s/p thymectomy .     Currently on Prednisone 10 mg qd, Imuran 100/100 mg, Timespan 180 mg qAM, and Mestinon 30 mg q4.     Notes SULLIVAN. No difficulty chewing/swallowing.  "Intermittent ptosis and diplopia which usually lasts a few minutes. Also notes watery eyes.     Notes chronic neck and back pain. Also pain in legs. Describes as intermittent aching. Worse with activity. PCP prescribed Gabapentin, has not helped. Hasn't done PT.     Also notes chronic chest pain since thymectomy. Occurs about every other day, usually lasts a few hrs. Takes Tylenol with minor relief.     Interval hx 12/16/16:  Vicky Joy is a 37 yo female with generalized MG dx'ed in 2010, s/p thymectomy in 2011. Symptoms are stable from last visit. Still has good and bad days. Has some occasional SULLIVAN with walking, takes a break and can keep going. Arms and legs intermittently weak. Diplopia has improved. Has wisdom tooth with pain, eating soft food, no problems chewing/swallowing     Interval hx 10/7/16:  Vicky Joy is a 37 yo female with generalized MG dx'ed in 2010, s/p thymectomy in 2011. Has been about 9 mos since last clinic visit. She states she has been doing fairly well since last being seen. However, also states she has some bad days which occur a few times a week, on these days she feels tired, lays in bed, "doesn't feel well", also states feels weaker in arms and legs. Has intermittent diplopia about once per month. Also complains of increased SULLIVAN over the past few weeks, occurs when walking. +weight gain 2/2 prednisone. Denies difficulty chewing or swallowing. DM currently well-controlled.    Current medications include:  Prednisone 11 mg daily (last visit was decreased from 15 mg to 12, has been taking 11 mg)   Imuran 100/50   Timespan 180 qam   Mestinon 30 q 4x/day     Interval 1/13/16: Doing well. Occasional ptosis but no diplopia. No dysarthria or dysphagia.     Interval 9/18/15: Doing about the same, to a little weaker in her shoulders. No other new symptoms.     Interval 5/8/15: Now taking imuran 100mg bid (she weighs 86kg, so just over 2mg/kg). Doing well from MG " standpoint.     Interval 2/6/15: Missed appointment with me in Nov. Returns today. MG stable, but having back pain. Last bone scans in July 2014.      Was supposed to be on imuran 100mg bid, but only taking it 50mg bid. Comes on bottle as 50mg bid, despite my writing it otherwise.      Interval 10/7/14: Doing about the same. Still with end of the day diplopia and ptosis. No chewing or swallowing difficulties. Strength is good.      HPI: Vicky Joy is a 35 y.o. female with generalized MG who returns today for f/u. She increased her imuran as I instructed her to do. Her disease burden is better.    HPI 2/18/13: Vicky Joy is a 32 y.o. female with a diagnosis of generalized MG diagnosed in 2010 after presenting with difficulty chewing, swallowing and generalized weakness. She presented at that time in crisis and was intubated and admitted to a critical care unit, where the diagnosis was made.      She has been on prednisone and mestinon since her diagnosis, and is currently on 25mg/day. She has not had any subsequent hospitalizations, but has had several rounds of PLEX as an outpatient. Her last exchange was (she thinks) was in 2012.      She has diplopia and generalized weakness at baseline, but in the last few weeks, she has had increasing lower extremity weakness and slightly more difficulty swallowing.     She had a thymectomy in the fall of 2011.     Medications:   Current Outpatient Medications   Medication Sig Dispense Refill    blood sugar diagnostic Strp 1 strip by Misc.(Non-Drug; Combo Route) route 2 (two) times daily before meals. 100 strip 3    gabapentin (NEURONTIN) 100 MG capsule Take 1 capsule (100 mg total) by mouth 3 (three) times daily. 270 capsule 3    glipiZIDE (GLUCOTROL) 10 MG tablet TAKE 1 TABLET BY MOUTH DAILY WITH BREAKFAST 90 tablet 3    lancets (BD ULTRA FINE LANCETS) 33 gauge Misc 1 lancet by Misc.(Non-Drug; Combo Route) route 2 (two) times daily before meals. 50 each 3     lisinopril 10 MG tablet Take 1 tablet (10 mg total) by mouth once daily. 90 tablet 3    metformin (GLUCOPHAGE) 1000 MG tablet Take 1 tablet (1,000 mg total) by mouth 2 (two) times daily with meals. 180 tablet 3    nystatin (MYCOSTATIN) cream Apply topically 2 (two) times daily. FOR YEAST INFECTION UNDER BREASTS 60 g 3    omeprazole (PRILOSEC) 20 MG capsule TAKE 1 CAPSULE BY MOUTH ONCE DAILY. 90 capsule 2    predniSONE (DELTASONE) 10 MG tablet Take 2 tablets (20 mg total) by mouth once daily. 180 tablet 3    pyridostigmine (MESTINON) 180 mg TbSR TAKE 1 TABLET (180 MG TOTAL) BY MOUTH ONCE DAILY. 30 tablet 5    pyridostigmine (MESTINON) 60 mg Tab TAKE 1/2 TABLET BY MOUTH EVERY 4 HOURS 120 tablet 5    rosuvastatin (CRESTOR) 20 MG tablet TAKE 1 TABLET (20 MG TOTAL) BY MOUTH ONCE DAILY. 90 tablet 3    vitamin D 1000 units Tab Take 185 mg by mouth once daily.      azathioprine (IMURAN) 50 mg Tab Take 2 tablets (100 mg total) by mouth 2 (two) times daily. 360 tablet 3    calcium carbonate (OS-MARISOL) 500 mg calcium (1,250 mg) tablet Take 1 tablet (500 mg total) by mouth 2 (two) times daily. 180 tablet 3     No current facility-administered medications for this visit.        Allergies:  Review of patient's allergies indicates:   Allergen Reactions    Cellcept [mycophenolate mofetil]      Weakness (muscle)^       PMHx:  Past Medical History:   Diagnosis Date    Diabetes mellitus type II     Headache(784.0)     Hypertension     Myasthenia gravis without exacerbation     Obesity     Other and unspecified hyperlipidemia      Past Surgical History:   Procedure Laterality Date    SPINE SURGERY      THYMECTOMY         Fhx:  Family History   Problem Relation Age of Onset    Cancer Mother         breast cancer survivor    Diabetes Father        Shx:   Social History     Socioeconomic History    Marital status: Single     Spouse name: Not on file    Number of children: Not on file    Years of education: Not on  "file    Highest education level: Not on file   Social Needs    Financial resource strain: Not on file    Food insecurity - worry: Not on file    Food insecurity - inability: Not on file    Transportation needs - medical: Not on file    Transportation needs - non-medical: Not on file   Occupational History    Not on file   Tobacco Use    Smoking status: Never Smoker    Smokeless tobacco: Never Used   Substance and Sexual Activity    Alcohol use: No    Drug use: No    Sexual activity: No     Partners: Male     Birth control/protection: None   Other Topics Concern    Not on file   Social History Narrative    Not on file       ROS:  Review of Systems (Questions asked; positives or additions noted in BOLD)  Gen Malaise/fatigue, weight change   HEENT Hearing loss, blurred vision, diplopia (intermittent)   Card CP, palpitations   Pulm SOB, cough    Vasc Easy bruising, easy bleeding    GI Nausea, vomiting, constipation    Frequency, urgency   M/S Joint pain, myalgias, falls    Endocrine Temperature intolerance, polyuria, polydipsia   Neuro Dizziness, tremors, seizures, focal weakness, Others- as noted in HPI   Psych Memory loss, depression, anxiety, hallucinations     PHYSICAL EXAM:   /69   Pulse 66   Ht 5' 6" (1.676 m)   Wt 83 kg (182 lb 15.7 oz)   BMI 29.53 kg/m²    GEN:  NAD, well-developed, well-groomed.  HEENT: No cervical lymphadenopathy noted.  CARDIOVASCULAR: Radial pulses 2+ bilaterally. No LE edema noted.  NEURO:  Mental Status: Awake, alert, and oriented. Normal attention and concentration.  Speech is fluent and appropriate language function.    Cranial Nerves:  II Pupils are round and reactive to direct and consensual light and accommodation. Visual fields full to confrontation.   III, IV, VI Extraocular eye movements full bilaterally; no diplopia on down-gaze. No ptosis.    V Normal facial sensation to pinprick and light touch.   VII Symmetric facial expressions.   VIII Hearing intact " to finger rub bilaterally.   IX, X Palate elevates midline and symmetrically.   XI Shoulder elevation is symmetric and full strength bilaterally. Neck rotation strength is normal bilaterally.   XII Tongue is midline.     Sensory: Sensation is normal to light touch in the upper and lower extremities bilaterally. Romberg is Negative.    Motor: Extremities demonstrate normal bulk and tone in all four limbs. No atrophy or fasciculations noted.   Strength-       RUE: 5/5                LUE: 5/5                RLE: 5/5                LLE: 5/5     Neck flexion/extension: 5/5  Good orbicularis oris strength.Good orbicularis oculi strength  No SOB noted during exam.    Deep Tendon Relfexes: 2+ and symmetric in the upper and lower extremities bilaterally. Babinski mute bilaterally.    Coordination: Finger to nose WNL.     Gait: Normal casual gait.    ASSESSMENT and PLAN:   Problem List Items Addressed This Visit     MG (myasthenia gravis)    Current Assessment & Plan     MG, stable.   Maintain current regimen  Blood work today    Follow up with Dr Angel in December since I am leaving [facility].            Other Visit Diagnoses     Myasthenia gravis    -  Primary    Relevant Orders    CBC auto differential    Hepatic function panel

## 2018-10-16 NOTE — ASSESSMENT & PLAN NOTE
MG, stable.   Maintain current regimen  Blood work today    Follow up with Dr Angel in December since I am leaving [facility].

## 2018-11-09 RX ORDER — GABAPENTIN 100 MG/1
100 CAPSULE ORAL 3 TIMES DAILY
Qty: 270 CAPSULE | Refills: 2 | Status: SHIPPED | OUTPATIENT
Start: 2018-11-09 | End: 2019-08-21 | Stop reason: SDUPTHER

## 2018-11-26 RX ORDER — OMEPRAZOLE 20 MG/1
CAPSULE, DELAYED RELEASE ORAL
Qty: 90 CAPSULE | Refills: 2 | Status: SHIPPED | OUTPATIENT
Start: 2018-11-26 | End: 2019-02-18

## 2019-01-08 ENCOUNTER — LAB VISIT (OUTPATIENT)
Dept: LAB | Facility: OTHER | Age: 39
End: 2019-01-08
Payer: MEDICARE

## 2019-01-08 DIAGNOSIS — K29.80 CYTOMEGALOVIRAL DUODENITIS: ICD-10-CM

## 2019-01-08 DIAGNOSIS — K21.00 REFLUX ESOPHAGITIS: ICD-10-CM

## 2019-01-08 DIAGNOSIS — B25.8 CYTOMEGALOVIRAL DUODENITIS: ICD-10-CM

## 2019-01-08 DIAGNOSIS — I10 ESSENTIAL HYPERTENSION, MALIGNANT: Primary | ICD-10-CM

## 2019-01-08 DIAGNOSIS — R14.0 ABDOMINAL DISTENTION: ICD-10-CM

## 2019-01-08 DIAGNOSIS — E11.9 DIABETES MELLITUS WITHOUT COMPLICATION: ICD-10-CM

## 2019-01-08 DIAGNOSIS — R10.13 ABDOMINAL PAIN, EPIGASTRIC: ICD-10-CM

## 2019-01-08 LAB
AMYLASE SERPL-CCNC: 105 U/L
BASOPHILS # BLD AUTO: 0.03 K/UL
BASOPHILS NFR BLD: 0.3 %
CRP SERPL-MCNC: 1.1 MG/L
DIFFERENTIAL METHOD: ABNORMAL
EOSINOPHIL # BLD AUTO: 0 K/UL
EOSINOPHIL NFR BLD: 0.4 %
ERYTHROCYTE [DISTWIDTH] IN BLOOD BY AUTOMATED COUNT: 15.8 %
HCT VFR BLD AUTO: 39.9 %
HGB BLD-MCNC: 12.5 G/DL
LYMPHOCYTES # BLD AUTO: 1.3 K/UL
LYMPHOCYTES NFR BLD: 13.9 %
MCH RBC QN AUTO: 29.6 PG
MCHC RBC AUTO-ENTMCNC: 31.3 G/DL
MCV RBC AUTO: 95 FL
MONOCYTES # BLD AUTO: 0.9 K/UL
MONOCYTES NFR BLD: 9.3 %
NEUTROPHILS # BLD AUTO: 7.2 K/UL
NEUTROPHILS NFR BLD: 75.9 %
PLATELET # BLD AUTO: 372 K/UL
PMV BLD AUTO: 12.5 FL
RBC # BLD AUTO: 4.22 M/UL
WBC # BLD AUTO: 9.55 K/UL

## 2019-01-08 PROCEDURE — 83516 IMMUNOASSAY NONANTIBODY: CPT | Mod: 59

## 2019-01-08 PROCEDURE — 85025 COMPLETE CBC W/AUTO DIFF WBC: CPT

## 2019-01-08 PROCEDURE — 86140 C-REACTIVE PROTEIN: CPT

## 2019-01-08 PROCEDURE — 82150 ASSAY OF AMYLASE: CPT

## 2019-01-08 PROCEDURE — 36415 COLL VENOUS BLD VENIPUNCTURE: CPT

## 2019-01-10 ENCOUNTER — HOSPITAL ENCOUNTER (OUTPATIENT)
Dept: RADIOLOGY | Facility: OTHER | Age: 39
Discharge: HOME | End: 2019-01-10
Attending: INTERNAL MEDICINE
Payer: MEDICARE

## 2019-01-10 DIAGNOSIS — I10 HTN (HYPERTENSION): ICD-10-CM

## 2019-01-10 DIAGNOSIS — R14.0 ABDOMINAL BLOATING: ICD-10-CM

## 2019-01-10 DIAGNOSIS — R10.13 ABDOMINAL PAIN, EPIGASTRIC: ICD-10-CM

## 2019-01-10 DIAGNOSIS — E11.9 DM (DIABETES MELLITUS): ICD-10-CM

## 2019-01-10 DIAGNOSIS — K21.00 GASTRO-ESOPHAGEAL REFLUX DISEASE WITH ESOPHAGITIS: ICD-10-CM

## 2019-01-10 DIAGNOSIS — K29.80 DUODENITIS WITHOUT BLEEDING: ICD-10-CM

## 2019-01-10 LAB
GLIADIN PEPTIDE IGA SER-ACNC: 7 UNITS
GLIADIN PEPTIDE IGG SER-ACNC: 3 UNITS
IGA SERPL-MCNC: 211 MG/DL
TTG IGA SER-ACNC: 6 UNITS
TTG IGG SER-ACNC: 4 UNITS

## 2019-01-10 PROCEDURE — 78227 HEPATOBIL SYST IMAGE W/DRUG: CPT | Mod: 26,,, | Performed by: RADIOLOGY

## 2019-01-10 PROCEDURE — 78227 NM HEPATOBILIARY(HIDA) WITH PHARM AND EF: ICD-10-PCS | Mod: 26,,, | Performed by: RADIOLOGY

## 2019-01-10 PROCEDURE — A9537 TC99M MEBROFENIN: HCPCS

## 2019-01-14 DIAGNOSIS — E11.9 TYPE 2 DIABETES MELLITUS WITHOUT COMPLICATION: ICD-10-CM

## 2019-01-30 DIAGNOSIS — E11.9 TYPE 2 DIABETES MELLITUS WITHOUT COMPLICATION, UNSPECIFIED WHETHER LONG TERM INSULIN USE: ICD-10-CM

## 2019-02-13 DIAGNOSIS — G70.00 MYASTHENIA GRAVIS WITHOUT EXACERBATION: ICD-10-CM

## 2019-02-13 DIAGNOSIS — G70.00 MYASTHENIA GRAVIS: ICD-10-CM

## 2019-02-14 RX ORDER — PYRIDOSTIGMINE BROMIDE 60 MG/1
TABLET ORAL
Qty: 120 TABLET | Refills: 5 | Status: SHIPPED | OUTPATIENT
Start: 2019-02-14 | End: 2019-02-18 | Stop reason: SDUPTHER

## 2019-02-18 ENCOUNTER — CLINICAL SUPPORT (OUTPATIENT)
Dept: OPTOMETRY | Facility: CLINIC | Age: 39
End: 2019-02-18
Payer: MEDICARE

## 2019-02-18 ENCOUNTER — OFFICE VISIT (OUTPATIENT)
Dept: INTERNAL MEDICINE | Facility: CLINIC | Age: 39
End: 2019-02-18
Payer: MEDICARE

## 2019-02-18 ENCOUNTER — LAB VISIT (OUTPATIENT)
Dept: LAB | Facility: HOSPITAL | Age: 39
End: 2019-02-18
Attending: INTERNAL MEDICINE
Payer: MEDICARE

## 2019-02-18 VITALS
HEIGHT: 66 IN | HEART RATE: 73 BPM | DIASTOLIC BLOOD PRESSURE: 60 MMHG | SYSTOLIC BLOOD PRESSURE: 100 MMHG | OXYGEN SATURATION: 99 % | WEIGHT: 181.44 LBS | BODY MASS INDEX: 29.16 KG/M2

## 2019-02-18 DIAGNOSIS — T38.0X5A STEROID-INDUCED DIABETES: ICD-10-CM

## 2019-02-18 DIAGNOSIS — I15.2 HYPERTENSION ASSOCIATED WITH DIABETES: ICD-10-CM

## 2019-02-18 DIAGNOSIS — E09.9 STEROID-INDUCED DIABETES: ICD-10-CM

## 2019-02-18 DIAGNOSIS — E11.59 HYPERTENSION ASSOCIATED WITH DIABETES: ICD-10-CM

## 2019-02-18 DIAGNOSIS — G70.00 MG (MYASTHENIA GRAVIS): Primary | ICD-10-CM

## 2019-02-18 DIAGNOSIS — E78.2 MIXED HYPERLIPIDEMIA: ICD-10-CM

## 2019-02-18 DIAGNOSIS — M85.80 OSTEOPENIA: ICD-10-CM

## 2019-02-18 LAB
CHOLEST SERPL-MCNC: 234 MG/DL
CHOLEST/HDLC SERPL: 4.3 {RATIO}
ESTIMATED AVG GLUCOSE: 131 MG/DL
HBA1C MFR BLD HPLC: 6.2 %
HDLC SERPL-MCNC: 55 MG/DL
HDLC SERPL: 23.5 %
LDLC SERPL CALC-MCNC: 158.6 MG/DL
NONHDLC SERPL-MCNC: 179 MG/DL
TRIGL SERPL-MCNC: 102 MG/DL

## 2019-02-18 PROCEDURE — 99214 OFFICE O/P EST MOD 30 MIN: CPT | Mod: S$GLB,,, | Performed by: INTERNAL MEDICINE

## 2019-02-18 PROCEDURE — 83036 HEMOGLOBIN GLYCOSYLATED A1C: CPT

## 2019-02-18 PROCEDURE — 99999 PR PBB SHADOW E&M-EST. PATIENT-LVL III: CPT | Mod: PBBFAC,,, | Performed by: INTERNAL MEDICINE

## 2019-02-18 PROCEDURE — 99999 PR PBB SHADOW E&M-EST. PATIENT-LVL I: ICD-10-PCS | Mod: PBBFAC,,,

## 2019-02-18 PROCEDURE — 99999 PR PBB SHADOW E&M-EST. PATIENT-LVL III: ICD-10-PCS | Mod: PBBFAC,,, | Performed by: INTERNAL MEDICINE

## 2019-02-18 PROCEDURE — 99214 PR OFFICE/OUTPT VISIT, EST, LEVL IV, 30-39 MIN: ICD-10-PCS | Mod: S$GLB,,, | Performed by: INTERNAL MEDICINE

## 2019-02-18 PROCEDURE — 92250 DIABETIC EYE SCREENING PHOTO: ICD-10-PCS | Mod: S$GLB,,, | Performed by: OPHTHALMOLOGY

## 2019-02-18 PROCEDURE — 92250 FUNDUS PHOTOGRAPHY W/I&R: CPT | Mod: S$GLB,,, | Performed by: OPHTHALMOLOGY

## 2019-02-18 PROCEDURE — 99999 PR PBB SHADOW E&M-EST. PATIENT-LVL I: CPT | Mod: PBBFAC,,,

## 2019-02-18 PROCEDURE — 80061 LIPID PANEL: CPT

## 2019-02-18 PROCEDURE — 36415 COLL VENOUS BLD VENIPUNCTURE: CPT

## 2019-02-18 RX ORDER — CALCIUM CARBONATE 500(1250)
1 TABLET ORAL 2 TIMES DAILY
Qty: 180 TABLET | Refills: 3 | Status: SHIPPED | OUTPATIENT
Start: 2019-02-18 | End: 2021-04-30

## 2019-02-18 RX ORDER — PANTOPRAZOLE SODIUM 40 MG/1
TABLET, DELAYED RELEASE ORAL
COMMUNITY
Start: 2019-02-17 | End: 2021-12-17 | Stop reason: SDUPTHER

## 2019-02-18 RX ORDER — PYRIDOSTIGMINE BROMIDE 60 MG/1
TABLET ORAL
Qty: 120 TABLET | Refills: 5 | Status: SHIPPED | OUTPATIENT
Start: 2019-02-18 | End: 2019-03-29 | Stop reason: SDUPTHER

## 2019-02-18 RX ORDER — AZATHIOPRINE 50 MG/1
100 TABLET ORAL 2 TIMES DAILY
Qty: 360 TABLET | Refills: 3 | Status: SHIPPED | OUTPATIENT
Start: 2019-02-18 | End: 2019-05-11 | Stop reason: SDUPTHER

## 2019-02-18 NOTE — PROGRESS NOTES
Subjective:       Patient ID: Vicky Joy is a 38 y.o. female.    Chief Complaint: Follow-up    HPI - Vicky feels well today.  MG stable, no symptoms at the moment.  Not checking home BS; insurance isn't covering lancets.  Taking all medications as prescribed; needs some refills.  Due for some diabetic health maintenance.  Not a smoker    PMH:  Myasthenia gravis on steroids long term  Steroid induced DM  HTN  Fibromyalgia     Meds:  Reviewed and reconciled in EPIC with patient during visit today.    Review of Systems   Constitutional: Negative for fever.   HENT: Negative for congestion.    Respiratory: Negative for shortness of breath.    Cardiovascular: Negative for chest pain.   Gastrointestinal: Negative for abdominal pain.   Genitourinary: Negative for difficulty urinating.   Musculoskeletal: Positive for neck pain.   Skin: Negative for rash.   Neurological: Negative for headaches.   Psychiatric/Behavioral: Negative for sleep disturbance.       Objective:      Physical Exam   Constitutional: She is oriented to person, place, and time. She appears well-developed and well-nourished.   Well-appearing, with moon face and buffalo hump   HENT:   Head: Normocephalic and atraumatic.   Cardiovascular: Normal rate, regular rhythm and normal heart sounds. Exam reveals no gallop and no friction rub.   No murmur heard.  Pulmonary/Chest: Effort normal and breath sounds normal. No respiratory distress. She has no wheezes. She has no rales. She exhibits no tenderness.   Neurological: She is alert and oriented to person, place, and time.   Skin: Skin is warm and dry. No erythema.   Psychiatric: She has a normal mood and affect.   Nursing note and vitals reviewed.      Assessment:       1. MG (myasthenia gravis)    2. Steroid-induced diabetes    3. Hypertension associated with diabetes    4. Mixed hyperlipidemia    5. Osteopenia        Plan:       Vicky was seen today for follow-up.    Diagnoses and all orders for  this visit:    MG (myasthenia gravis) - st able on current treatment.  Refilling medications  -     azaTHIOprine (IMURAN) 50 mg Tab; Take 2 tablets (100 mg total) by mouth 2 (two) times daily.  -     pyridostigmine (MESTINON) 60 mg Tab; TAKE 1/2 TABLET BY MOUTH EVERY 4 HOURS    Steroid-induced diabetes - stable, last a1c at goal.  Updating lab work  -     Hemoglobin A1c; Future  -     Lipid panel; Future  -     Diabetic Eye Screening Photo; Future    Hypertension associated with diabetes - controlled, stay the course  -     Lipid panel; Future    Mixed hyperlipidemia - stable on a statin.  Updating lab work  -     Lipid panel; Future    Osteopenia - stable.  Refilling calcium  -     calcium carbonate (OS-MARISLO) 500 mg calcium (1,250 mg) tablet; Take 1 tablet (500 mg total) by mouth 2 (two) times daily.    rtc 6 months    LEE Razo MD MPH  Staff Internist

## 2019-02-18 NOTE — PROGRESS NOTES
HPI     Diabetic Eye Exam      Additional comments: photos              Comments     Screening photos          Last edited by La Remy MA on 2/18/2019 11:36 AM. (History)            Assessment /Plan     For exam results, see Encounter Report.    Steroid-induced diabetes  -     Diabetic Eye Screening Photo      38 y.o. y/o here for screening for Diabetic Renopathy with non-dilated fundus photos per Nirmal Razo Ii, MD

## 2019-02-19 ENCOUNTER — PATIENT MESSAGE (OUTPATIENT)
Dept: INTERNAL MEDICINE | Facility: CLINIC | Age: 39
End: 2019-02-19

## 2019-03-28 ENCOUNTER — OFFICE VISIT (OUTPATIENT)
Dept: OBSTETRICS AND GYNECOLOGY | Facility: CLINIC | Age: 39
End: 2019-03-28
Payer: MEDICARE

## 2019-03-28 VITALS
WEIGHT: 177 LBS | DIASTOLIC BLOOD PRESSURE: 74 MMHG | SYSTOLIC BLOOD PRESSURE: 110 MMHG | BODY MASS INDEX: 28.45 KG/M2 | HEIGHT: 66 IN

## 2019-03-28 DIAGNOSIS — N89.8 VAGINAL IRRITATION: ICD-10-CM

## 2019-03-28 DIAGNOSIS — Z12.4 CERVICAL CANCER SCREENING: Primary | ICD-10-CM

## 2019-03-28 LAB
BACTERIAL VAGINOSIS DNA: POSITIVE
CANDIDA GLABRATA DNA: NEGATIVE
CANDIDA KRUSEI DNA: NEGATIVE
CANDIDA RRNA VAG QL PROBE: NEGATIVE
T VAGINALIS RRNA GENITAL QL PROBE: NEGATIVE

## 2019-03-28 PROCEDURE — 99385 PR PREVENTIVE VISIT,NEW,18-39: ICD-10-PCS | Mod: 25,S$GLB,, | Performed by: OBSTETRICS & GYNECOLOGY

## 2019-03-28 PROCEDURE — 99999 PR PBB SHADOW E&M-EST. PATIENT-LVL III: CPT | Mod: PBBFAC,,, | Performed by: OBSTETRICS & GYNECOLOGY

## 2019-03-28 PROCEDURE — 87510 GARDNER VAG DNA DIR PROBE: CPT

## 2019-03-28 PROCEDURE — 88175 CYTOPATH C/V AUTO FLUID REDO: CPT

## 2019-03-28 PROCEDURE — 99999 PR PBB SHADOW E&M-EST. PATIENT-LVL III: ICD-10-PCS | Mod: PBBFAC,,, | Performed by: OBSTETRICS & GYNECOLOGY

## 2019-03-28 PROCEDURE — 99385 PREV VISIT NEW AGE 18-39: CPT | Mod: 25,S$GLB,, | Performed by: OBSTETRICS & GYNECOLOGY

## 2019-03-28 PROCEDURE — 87480 CANDIDA DNA DIR PROBE: CPT

## 2019-03-28 PROCEDURE — 87624 HPV HI-RISK TYP POOLED RSLT: CPT

## 2019-03-28 RX ORDER — METRONIDAZOLE 500 MG/1
500 TABLET ORAL EVERY 12 HOURS
Qty: 14 TABLET | Refills: 0 | Status: SHIPPED | OUTPATIENT
Start: 2019-03-28 | End: 2019-04-04

## 2019-03-29 DIAGNOSIS — G70.00 MYASTHENIA GRAVIS WITHOUT EXACERBATION: ICD-10-CM

## 2019-03-29 DIAGNOSIS — G70.00 MG (MYASTHENIA GRAVIS): ICD-10-CM

## 2019-03-29 DIAGNOSIS — G70.00 MYASTHENIA GRAVIS: ICD-10-CM

## 2019-03-29 RX ORDER — OMEPRAZOLE 20 MG/1
20 CAPSULE, DELAYED RELEASE ORAL DAILY
Refills: 2 | COMMUNITY
Start: 2019-02-21 | End: 2021-12-17

## 2019-04-01 RX ORDER — PYRIDOSTIGMINE BROMIDE 180 MG/1
180 TABLET, EXTENDED RELEASE ORAL DAILY
Qty: 90 TABLET | Refills: 3 | Status: SHIPPED | OUTPATIENT
Start: 2019-04-01 | End: 2019-04-03 | Stop reason: SDUPTHER

## 2019-04-01 RX ORDER — PYRIDOSTIGMINE BROMIDE 60 MG/1
30 TABLET ORAL EVERY 4 HOURS
Qty: 270 TABLET | Refills: 3 | Status: SHIPPED | OUTPATIENT
Start: 2019-04-01 | End: 2019-04-03 | Stop reason: SDUPTHER

## 2019-04-03 DIAGNOSIS — G70.00 MG (MYASTHENIA GRAVIS): ICD-10-CM

## 2019-04-03 DIAGNOSIS — G70.00 MYASTHENIA GRAVIS WITHOUT EXACERBATION: ICD-10-CM

## 2019-04-03 DIAGNOSIS — G70.00 MYASTHENIA GRAVIS: ICD-10-CM

## 2019-04-03 LAB
HPV HR 12 DNA CVX QL NAA+PROBE: NEGATIVE
HPV16 AG SPEC QL: NEGATIVE
HPV18 DNA SPEC QL NAA+PROBE: NEGATIVE

## 2019-04-03 RX ORDER — PYRIDOSTIGMINE BROMIDE 180 MG/1
180 TABLET, EXTENDED RELEASE ORAL DAILY
Qty: 90 TABLET | Refills: 3 | Status: SHIPPED | OUTPATIENT
Start: 2019-04-03 | End: 2019-07-02

## 2019-04-03 RX ORDER — PYRIDOSTIGMINE BROMIDE 60 MG/1
30 TABLET ORAL EVERY 4 HOURS
Qty: 270 TABLET | Refills: 3 | Status: SHIPPED | OUTPATIENT
Start: 2019-04-03 | End: 2019-07-02

## 2019-04-09 ENCOUNTER — TELEPHONE (OUTPATIENT)
Dept: NEUROLOGY | Facility: CLINIC | Age: 39
End: 2019-04-09

## 2019-04-09 NOTE — TELEPHONE ENCOUNTER
----- Message from Rose Rodriguez sent at 4/8/2019 12:23 PM CDT -----  Contact: Self- [phone]  Olvin- pt called to reschedule consult appt- originally for 4/8 at 2:40pm- pt unable to make it- please contact pt at [phone]

## 2019-05-11 DIAGNOSIS — G70.00 MG (MYASTHENIA GRAVIS): ICD-10-CM

## 2019-05-12 RX ORDER — AZATHIOPRINE 50 MG/1
100 TABLET ORAL 2 TIMES DAILY
Qty: 360 TABLET | Refills: 3 | Status: SHIPPED | OUTPATIENT
Start: 2019-05-12 | End: 2020-10-30 | Stop reason: SDUPTHER

## 2019-06-24 ENCOUNTER — TELEPHONE (OUTPATIENT)
Dept: NEUROLOGY | Facility: CLINIC | Age: 39
End: 2019-06-24

## 2019-06-24 NOTE — TELEPHONE ENCOUNTER
----- Message from Homero Shelton sent at 6/24/2019  3:51 PM CDT -----  Contact: Pt. [phone]  Needs Advice    Reason for call: The patient would like to speak to you regarding rescheduling her appointment. She prefers Thursday or Friday.        Communication Preference:PHONE    Additional Information:

## 2019-08-12 RX ORDER — GLIPIZIDE 10 MG/1
TABLET ORAL
Qty: 90 TABLET | Refills: 3 | Status: SHIPPED | OUTPATIENT
Start: 2019-08-12 | End: 2020-03-05 | Stop reason: SDUPTHER

## 2019-08-21 DIAGNOSIS — E78.5 HYPERLIPIDEMIA, UNSPECIFIED HYPERLIPIDEMIA TYPE: ICD-10-CM

## 2019-08-21 DIAGNOSIS — T38.0X5A STEROID-INDUCED DIABETES: ICD-10-CM

## 2019-08-21 DIAGNOSIS — E09.9 STEROID-INDUCED DIABETES: ICD-10-CM

## 2019-08-22 RX ORDER — GABAPENTIN 100 MG/1
100 CAPSULE ORAL 3 TIMES DAILY
Qty: 270 CAPSULE | Refills: 0 | Status: SHIPPED | OUTPATIENT
Start: 2019-08-22 | End: 2019-12-05 | Stop reason: SDUPTHER

## 2019-08-22 RX ORDER — ROSUVASTATIN CALCIUM 20 MG/1
20 TABLET, COATED ORAL DAILY
Qty: 90 TABLET | Refills: 0 | Status: SHIPPED | OUTPATIENT
Start: 2019-08-22 | End: 2019-12-05 | Stop reason: SDUPTHER

## 2019-08-29 ENCOUNTER — TELEPHONE (OUTPATIENT)
Dept: NEUROLOGY | Facility: CLINIC | Age: 39
End: 2019-08-29

## 2019-08-30 NOTE — TELEPHONE ENCOUNTER
----- Message from Pao Oviedo sent at 8/29/2019  1:41 PM CDT -----  Contact: pt   Patient Requesting Sooner Appointment.     Reason for sooner appt.: fu appt for MG   When is the first available appointment? N/a   Communication Preference: can you please call pt at [phone]  Additional Information:none    HERB

## 2019-09-05 ENCOUNTER — OFFICE VISIT (OUTPATIENT)
Dept: INTERNAL MEDICINE | Facility: CLINIC | Age: 39
End: 2019-09-05
Payer: MEDICARE

## 2019-09-05 ENCOUNTER — HOSPITAL ENCOUNTER (OUTPATIENT)
Dept: RADIOLOGY | Facility: CLINIC | Age: 39
Discharge: HOME | End: 2019-09-05
Attending: INTERNAL MEDICINE
Payer: MEDICARE

## 2019-09-05 VITALS
HEIGHT: 66 IN | HEART RATE: 80 BPM | BODY MASS INDEX: 26.89 KG/M2 | DIASTOLIC BLOOD PRESSURE: 84 MMHG | WEIGHT: 167.31 LBS | SYSTOLIC BLOOD PRESSURE: 122 MMHG

## 2019-09-05 DIAGNOSIS — Z79.52 LONG TERM SYSTEMIC STEROID USER: ICD-10-CM

## 2019-09-05 DIAGNOSIS — E11.59 HYPERTENSION ASSOCIATED WITH DIABETES: ICD-10-CM

## 2019-09-05 DIAGNOSIS — I15.2 HYPERTENSION ASSOCIATED WITH DIABETES: ICD-10-CM

## 2019-09-05 DIAGNOSIS — T38.0X5A STEROID-INDUCED DIABETES: ICD-10-CM

## 2019-09-05 DIAGNOSIS — G70.00 MG (MYASTHENIA GRAVIS): Primary | ICD-10-CM

## 2019-09-05 DIAGNOSIS — E09.9 STEROID-INDUCED DIABETES: ICD-10-CM

## 2019-09-05 DIAGNOSIS — E78.2 MIXED HYPERLIPIDEMIA: ICD-10-CM

## 2019-09-05 LAB
ALBUMIN/CREAT UR: 5.7 UG/MG (ref 0–30)
CREAT UR-MCNC: 87 MG/DL (ref 15–325)
MICROALBUMIN UR DL<=1MG/L-MCNC: 5 UG/ML

## 2019-09-05 PROCEDURE — 99214 OFFICE O/P EST MOD 30 MIN: CPT | Mod: S$GLB,,, | Performed by: INTERNAL MEDICINE

## 2019-09-05 PROCEDURE — 82043 UR ALBUMIN QUANTITATIVE: CPT

## 2019-09-05 PROCEDURE — 2024F PR 7 FIELD PHOTOS WITH INTERP/ REVIEW: ICD-10-PCS | Mod: S$GLB,,, | Performed by: INTERNAL MEDICINE

## 2019-09-05 PROCEDURE — 99999 PR PBB SHADOW E&M-EST. PATIENT-LVL III: CPT | Mod: PBBFAC,,, | Performed by: INTERNAL MEDICINE

## 2019-09-05 PROCEDURE — 77080 DXA BONE DENSITY AXIAL: CPT | Mod: 26,,, | Performed by: INTERNAL MEDICINE

## 2019-09-05 PROCEDURE — 99999 PR PBB SHADOW E&M-EST. PATIENT-LVL III: ICD-10-PCS | Mod: PBBFAC,,, | Performed by: INTERNAL MEDICINE

## 2019-09-05 PROCEDURE — 2024F 7 FLD RTA PHOTO EVC RTNOPTHY: CPT | Mod: S$GLB,,, | Performed by: INTERNAL MEDICINE

## 2019-09-05 PROCEDURE — 99214 PR OFFICE/OUTPT VISIT, EST, LEVL IV, 30-39 MIN: ICD-10-PCS | Mod: S$GLB,,, | Performed by: INTERNAL MEDICINE

## 2019-09-05 PROCEDURE — 77080 DEXA BONE DENSITY SPINE HIP: ICD-10-PCS | Mod: 26,,, | Performed by: INTERNAL MEDICINE

## 2019-09-05 PROCEDURE — 77080 DXA BONE DENSITY AXIAL: CPT | Mod: TC

## 2019-09-05 RX ORDER — LISINOPRIL 20 MG/1
20 TABLET ORAL
COMMUNITY
End: 2019-09-05

## 2019-09-05 NOTE — PROGRESS NOTES
Subjective:       Patient ID: Vicky Joy is a 39 y.o. female.    Chief Complaint: Annual Exam    HPI - Vicky feels well today.  Not looking at BS b/c she's out of strips.  Due for flu shot, dexa and a1c.  Still on steroids; changing neurologists b[facility]  She's not a smoker.  HM UTD    PMH:  Myasthenia gravis on steroids long term  Steroid induced DM  HTN  Fibromyalgia     Meds:  Reviewed and reconciled in EPIC with patient during visit today.    Review of Systems   Constitutional: Negative for fever.   HENT: Negative for congestion.    Respiratory: Negative for shortness of breath.    Cardiovascular: Positive for chest pain (sometimes, indigestion).   Gastrointestinal: Positive for abdominal pain (longstanding, chronic).   Genitourinary: Negative for difficulty urinating.   Musculoskeletal: Negative for arthralgias.   Skin: Negative for rash.   Neurological: Negative for headaches.   Psychiatric/Behavioral: Negative for sleep disturbance.       Objective:      Physical Exam   Constitutional: She is oriented to person, place, and time. She appears well-developed and well-nourished.   Friendly woman with moon face and buffalo hump   HENT:   Head: Normocephalic and atraumatic.   Cardiovascular: Normal rate, regular rhythm and normal heart sounds. Exam reveals no gallop and no friction rub.   No murmur heard.  Pulmonary/Chest: Effort normal and breath sounds normal. No respiratory distress. She has no wheezes. She has no rales. She exhibits no tenderness.   Neurological: She is alert and oriented to person, place, and time.   Skin: Skin is warm and dry. No erythema.   Psychiatric: She has a normal mood and affect.   Nursing note and vitals reviewed.      Assessment:       1. MG (myasthenia gravis)    2. Steroid-induced diabetes    3. Hypertension associated with diabetes    4. Mixed hyperlipidemia    5. Long term systemic steroid user        Plan:       Vicky was seen  today for annual exam.    Diagnoses and all orders for this visit:    MG (myasthenia gravis) - stable on treatment.  Follows with neurology    Steroid-induced diabetes - a1c has been well-controlled; due for repeat  -     Hemoglobin A1c; Future  -     Microalbumin/creatinine urine ratio  -     blood sugar diagnostic Strp; 1 strip by Misc.(Non-Drug; Combo Route) route 2 (two) times daily before meals.    Hypertension associated with diabetes - at goal, stay the course    Mixed hyperlipidemia - stable on a statin.  Not fasting today, so cannot do labs. Will repeat next time    Long term systemic steroid user - last done about 4 years ago.  Time for repeat  -     DXA Bone Density Spine And Hip; Future    rtc prn, or in 6 months    G Jake Razo MD MPH  Staff Internist

## 2019-11-17 DIAGNOSIS — I15.2 HYPERTENSION ASSOCIATED WITH DIABETES: ICD-10-CM

## 2019-11-17 DIAGNOSIS — E11.59 HYPERTENSION ASSOCIATED WITH DIABETES: ICD-10-CM

## 2019-11-18 RX ORDER — LISINOPRIL 10 MG/1
TABLET ORAL
Qty: 90 TABLET | Refills: 3 | Status: SHIPPED | OUTPATIENT
Start: 2019-11-18 | End: 2020-12-07

## 2019-11-21 ENCOUNTER — HOSPITAL ENCOUNTER (EMERGENCY)
Facility: OTHER | Age: 39
Discharge: HOME | End: 2019-11-22
Attending: EMERGENCY MEDICINE
Payer: MEDICARE

## 2019-11-21 DIAGNOSIS — J06.9 VIRAL URI WITH COUGH: Primary | ICD-10-CM

## 2019-11-21 PROCEDURE — 87081 CULTURE SCREEN ONLY: CPT

## 2019-11-21 PROCEDURE — 87880 STREP A ASSAY W/OPTIC: CPT

## 2019-11-21 PROCEDURE — 87502 INFLUENZA DNA AMP PROBE: CPT

## 2019-11-21 PROCEDURE — 99283 EMERGENCY DEPT VISIT LOW MDM: CPT

## 2019-11-22 VITALS
HEART RATE: 92 BPM | WEIGHT: 174 LBS | SYSTOLIC BLOOD PRESSURE: 134 MMHG | BODY MASS INDEX: 28.08 KG/M2 | TEMPERATURE: 99 F | OXYGEN SATURATION: 99 % | DIASTOLIC BLOOD PRESSURE: 85 MMHG | RESPIRATION RATE: 18 BRPM

## 2019-11-22 LAB
B-HCG UR QL: NEGATIVE
CTP QC/QA: YES
DEPRECATED S PYO AG THROAT QL EIA: NEGATIVE
INFLUENZA A, MOLECULAR: NEGATIVE
INFLUENZA B, MOLECULAR: NEGATIVE
SPECIMEN SOURCE: NORMAL

## 2019-11-22 PROCEDURE — 81025 URINE PREGNANCY TEST: CPT | Performed by: PHYSICIAN ASSISTANT

## 2019-11-22 PROCEDURE — 25000003 PHARM REV CODE 250: Performed by: PHYSICIAN ASSISTANT

## 2019-11-22 RX ORDER — IBUPROFEN 600 MG/1
600 TABLET ORAL
Status: COMPLETED | OUTPATIENT
Start: 2019-11-22 | End: 2019-11-22

## 2019-11-22 RX ORDER — BENZONATATE 100 MG/1
100 CAPSULE ORAL 3 TIMES DAILY PRN
Qty: 21 CAPSULE | Refills: 0 | Status: SHIPPED | OUTPATIENT
Start: 2019-11-22 | End: 2019-11-29

## 2019-11-22 RX ADMIN — IBUPROFEN 600 MG: 600 TABLET, FILM COATED ORAL at 12:11

## 2019-11-22 NOTE — ED TRIAGE NOTES
Pt reports he woke up with body aches on Wednesday.  Also reports cough, subjective fever, decreased appetite, and chills since Wednesday.  Denies any n/v/d.  Took Theraflu at 12 noon today.  Also reports sore throat.

## 2019-11-22 NOTE — ED PROVIDER NOTES
Encounter Date: 11/21/2019       History     Chief Complaint   Patient presents with    Cough     pt reports cough, fever, chills since wednesday, reports taking theraflu with no relief of symptoms     Patient is a 39-year-old female past medical history of diabetes mellitus type 2, hypertension, and myasthenia gravis who presents to the emergency department with complaints of headache, nasal congestion, sore throat, dry cough, decreased appetite, subjective fever, body aches that began 1 day prior to arrival.  She reports taking TheraFlu at home with minimal relief.  She denies any abdominal pain, nausea, vomiting, diarrhea, dysuria, chest pain, shortness of breath. Denies any other medication use at home.  Denies any alleviating or aggravating factors.  This is the extent of the patient's complaints at this time.         Review of patient's allergies indicates:   Allergen Reactions    Cellcept [mycophenolate mofetil]      Weakness (muscle)^     Past Medical History:   Diagnosis Date    Diabetes mellitus type II     Headache(784.0)     Hypertension     Myasthenia gravis without exacerbation     Obesity     Other and unspecified hyperlipidemia      Past Surgical History:   Procedure Laterality Date    SPINE SURGERY      THYMECTOMY       Family History   Problem Relation Age of Onset    Cancer Mother         breast cancer survivor, age 62    Diabetes Father      Social History     Tobacco Use    Smoking status: Never Smoker    Smokeless tobacco: Never Used   Substance Use Topics    Alcohol use: No    Drug use: No     Review of Systems   Constitutional: Positive for chills, fatigue and fever.   HENT: Positive for congestion, rhinorrhea and sore throat. Negative for sinus pressure and sinus pain.    Eyes: Negative.    Respiratory: Positive for cough. Negative for choking, chest tightness, shortness of breath and wheezing.    Cardiovascular: Negative for chest pain and palpitations.   Gastrointestinal:  Negative for constipation, diarrhea, nausea and vomiting.   Endocrine: Negative.    Genitourinary: Negative for dysuria.   Musculoskeletal: Positive for myalgias.   Skin: Negative.    Allergic/Immunologic: Negative.    Neurological: Positive for headaches. Negative for weakness.   Hematological: Negative.    Psychiatric/Behavioral: Negative.        Physical Exam     Initial Vitals [11/21/19 2334]   BP Pulse Resp Temp SpO2   134/85 92 18 100.2 °F (37.9 °C) 99 %      MAP       --         Physical Exam    Nursing note and vitals reviewed.  Constitutional: She appears well-developed and well-nourished. She is not diaphoretic. No distress.   HENT:   Head: Normocephalic and atraumatic.   Right Ear: External ear normal.   Left Ear: External ear normal.   Nose: Nose normal.   Mouth/Throat: Posterior oropharyngeal erythema present. No oropharyngeal exudate.   Eyes: Conjunctivae and EOM are normal. Pupils are equal, round, and reactive to light.   Neck: Normal range of motion.   Cardiovascular: Normal rate, regular rhythm, normal heart sounds and intact distal pulses.   Pulmonary/Chest: Breath sounds normal. She has no wheezes.   Abdominal: Soft. Bowel sounds are normal. There is no tenderness.   Musculoskeletal: Normal range of motion.   Lymphadenopathy:     She has no cervical adenopathy.   Neurological: She is alert and oriented to person, place, and time. She has normal strength. GCS score is 15. GCS eye subscore is 4. GCS verbal subscore is 5. GCS motor subscore is 6.   Skin: Skin is warm and dry. Capillary refill takes less than 2 seconds.   Psychiatric: She has a normal mood and affect. Her behavior is normal. Judgment and thought content normal.         ED Course   Procedures  Labs Reviewed   INFLUENZA A & B BY MOLECULAR   THROAT SCREEN, RAPID   CULTURE, STREP A,  THROAT   POCT URINE PREGNANCY             Medical Decision Making:   Initial Assessment:   Urgent evaluation of a 39-year-old female who presents to the  emergency department with complaints of headache, nasal congestion, sore throat, dry cough, subjective fever, aches that began 1 day prior to arrival.  She denies chest pain, shortness of breath, nausea, vomiting, diarrhea.  Patient is afebrile, hemodynamically stable, and nontoxic appearing.  Will order influenza swab and strep screen.   Clinical Tests:   Radiological Study: Ordered and Reviewed  ED Management:  UPT negative. Influenza and rapid strep screen negative. I suspect this is viral upper respiratory infection with cough.  Will prescribe patient Tessalon Perles for cough.  I also instructed her to purchase over-the-counter Cepacol throat lozenges and take ibuprofen and Tylenol at home as needed.  Risks and benefits of medications discussed.  All questions answered.  No further labs or imaging is required at this time. The patient was instructed to follow up with a primary care provider in 2 days or to return to the emergency department for worsening symptoms. The treatment plan was discussed with the patient who demonstrated understanding and comfort with plan. The patient's history, physical exam, and plan of care was discussed with and agreed upon with my supervising physician.                                    Clinical Impression:     1. Viral URI with cough            Disposition:   Disposition: Discharged  Condition: Stable                     Dede Calderon PA-C  11/22/19 0048

## 2019-11-23 ENCOUNTER — HOSPITAL ENCOUNTER (EMERGENCY)
Facility: HOSPITAL | Age: 39
Discharge: HOME | End: 2019-11-23
Attending: EMERGENCY MEDICINE
Payer: MEDICARE

## 2019-11-23 VITALS
HEART RATE: 98 BPM | OXYGEN SATURATION: 96 % | SYSTOLIC BLOOD PRESSURE: 115 MMHG | HEIGHT: 66 IN | RESPIRATION RATE: 25 BRPM | BODY MASS INDEX: 27.97 KG/M2 | WEIGHT: 174 LBS | TEMPERATURE: 100 F | DIASTOLIC BLOOD PRESSURE: 68 MMHG

## 2019-11-23 DIAGNOSIS — J10.1 INFLUENZA A: Primary | ICD-10-CM

## 2019-11-23 LAB
ANION GAP SERPL CALC-SCNC: 10 MMOL/L (ref 8–16)
BASOPHILS # BLD AUTO: 0.03 K/UL (ref 0–0.2)
BASOPHILS NFR BLD: 0.3 % (ref 0–1.9)
BUN SERPL-MCNC: 11 MG/DL (ref 6–20)
CALCIUM SERPL-MCNC: 9.2 MG/DL (ref 8.7–10.5)
CHLORIDE SERPL-SCNC: 110 MMOL/L (ref 95–110)
CO2 SERPL-SCNC: 21 MMOL/L (ref 23–29)
CREAT SERPL-MCNC: 0.9 MG/DL (ref 0.5–1.4)
CTP QC/QA: YES
DIFFERENTIAL METHOD: ABNORMAL
EOSINOPHIL # BLD AUTO: 0 K/UL (ref 0–0.5)
EOSINOPHIL NFR BLD: 0 % (ref 0–8)
ERYTHROCYTE [DISTWIDTH] IN BLOOD BY AUTOMATED COUNT: 15.6 % (ref 11.5–14.5)
EST. GFR  (AFRICAN AMERICAN): >60 ML/MIN/1.73 M^2
EST. GFR  (NON AFRICAN AMERICAN): >60 ML/MIN/1.73 M^2
GLUCOSE SERPL-MCNC: 100 MG/DL (ref 70–110)
HCT VFR BLD AUTO: 39.9 % (ref 37–48.5)
HGB BLD-MCNC: 12.8 G/DL (ref 12–16)
IMM GRANULOCYTES # BLD AUTO: 0.04 K/UL (ref 0–0.04)
IMM GRANULOCYTES NFR BLD AUTO: 0.4 % (ref 0–0.5)
LYMPHOCYTES # BLD AUTO: 0.4 K/UL (ref 1–4.8)
LYMPHOCYTES NFR BLD: 4.4 % (ref 18–48)
MCH RBC QN AUTO: 31.9 PG (ref 27–31)
MCHC RBC AUTO-ENTMCNC: 32.1 G/DL (ref 32–36)
MCV RBC AUTO: 100 FL (ref 82–98)
MONOCYTES # BLD AUTO: 0.3 K/UL (ref 0.3–1)
MONOCYTES NFR BLD: 3 % (ref 4–15)
NEUTROPHILS # BLD AUTO: 8.2 K/UL (ref 1.8–7.7)
NEUTROPHILS NFR BLD: 91.9 % (ref 38–73)
NRBC BLD-RTO: 0 /100 WBC
PLATELET # BLD AUTO: 271 K/UL (ref 150–350)
PMV BLD AUTO: 13.5 FL (ref 9.2–12.9)
POC MOLECULAR INFLUENZA A AGN: NEGATIVE
POC MOLECULAR INFLUENZA B AGN: POSITIVE
POCT GLUCOSE: 95 MG/DL (ref 70–110)
POTASSIUM SERPL-SCNC: 3.9 MMOL/L (ref 3.5–5.1)
RBC # BLD AUTO: 4.01 M/UL (ref 4–5.4)
SODIUM SERPL-SCNC: 141 MMOL/L (ref 136–145)
WBC # BLD AUTO: 8.91 K/UL (ref 3.9–12.7)

## 2019-11-23 PROCEDURE — 85025 COMPLETE CBC W/AUTO DIFF WBC: CPT

## 2019-11-23 PROCEDURE — 82962 GLUCOSE BLOOD TEST: CPT

## 2019-11-23 PROCEDURE — 99285 EMERGENCY DEPT VISIT HI MDM: CPT | Mod: ,,, | Performed by: EMERGENCY MEDICINE

## 2019-11-23 PROCEDURE — 99284 EMERGENCY DEPT VISIT MOD MDM: CPT | Mod: 25

## 2019-11-23 PROCEDURE — 80048 BASIC METABOLIC PNL TOTAL CA: CPT

## 2019-11-23 PROCEDURE — 63600175 PHARM REV CODE 636 W HCPCS: Performed by: STUDENT IN AN ORGANIZED HEALTH CARE EDUCATION/TRAINING PROGRAM

## 2019-11-23 PROCEDURE — 99285 PR EMERGENCY DEPT VISIT,LEVEL V: ICD-10-PCS | Mod: ,,, | Performed by: EMERGENCY MEDICINE

## 2019-11-23 PROCEDURE — 25000003 PHARM REV CODE 250: Performed by: STUDENT IN AN ORGANIZED HEALTH CARE EDUCATION/TRAINING PROGRAM

## 2019-11-23 PROCEDURE — 87502 INFLUENZA DNA AMP PROBE: CPT

## 2019-11-23 PROCEDURE — 96360 HYDRATION IV INFUSION INIT: CPT

## 2019-11-23 RX ORDER — ACETAMINOPHEN 325 MG/1
650 TABLET ORAL ONCE
Status: COMPLETED | OUTPATIENT
Start: 2019-11-23 | End: 2019-11-23

## 2019-11-23 RX ORDER — NAPROXEN 500 MG/1
500 TABLET ORAL
Status: COMPLETED | OUTPATIENT
Start: 2019-11-23 | End: 2019-11-23

## 2019-11-23 RX ADMIN — SODIUM CHLORIDE 1000 ML: 0.9 INJECTION, SOLUTION INTRAVENOUS at 06:11

## 2019-11-23 RX ADMIN — NAPROXEN 500 MG: 500 TABLET ORAL at 08:11

## 2019-11-23 RX ADMIN — SODIUM CHLORIDE 500 ML: 0.9 INJECTION, SOLUTION INTRAVENOUS at 09:11

## 2019-11-23 RX ADMIN — ACETAMINOPHEN 650 MG: 325 TABLET ORAL at 06:11

## 2019-11-23 NOTE — DISCHARGE INSTRUCTIONS
If worsening shortness of breath, please see PCP or return to the ED.   Take tylenol to help break fever.  NSAIDs like ibuprofen to help with body aches.   Plenty of fluids!

## 2019-11-23 NOTE — ED PROVIDER NOTES
Encounter Date: 11/23/2019       History     Chief Complaint   Patient presents with    Cough     cough, fever, body aches since wednesday, hx of MG an DM2     Patient is a 40 yo F with a PMH of myasthenia gravis, HTN, and DM2 who presents to the ED with multiple complaints.  She states that she has felt body aches, fever, chills, painful swallowing, runny nose, and congestion since Wednesday.   She recently visited the ED 2 days ago for the same complaints and was told she has a URI.   She also endorses shortness of breath that is worse with movement.  No chest pain.  She also endorses weakness, stating that she has to pull up one of her legs to get into the bed.  She denies any abdominal pain, nausea, vomiting or diarrhea.   She denies any prior myasthenia gravis crisis episodes.   She has tried taking ibuprofen and tylenol for her symptoms but she states it makes her feel worse.         Review of patient's allergies indicates:   Allergen Reactions    Cellcept [mycophenolate mofetil]      Weakness (muscle)^     Past Medical History:   Diagnosis Date    Diabetes mellitus type II     Headache(784.0)     Hypertension     Myasthenia gravis without exacerbation     Obesity     Other and unspecified hyperlipidemia      Past Surgical History:   Procedure Laterality Date    SPINE SURGERY      THYMECTOMY       Family History   Problem Relation Age of Onset    Cancer Mother         breast cancer survivor, age 62    Diabetes Father      Social History     Tobacco Use    Smoking status: Never Smoker    Smokeless tobacco: Never Used   Substance Use Topics    Alcohol use: No    Drug use: No     Review of Systems   Constitutional: Positive for appetite change (decreased ).   HENT: Positive for congestion, rhinorrhea and sore throat. Negative for trouble swallowing.    Eyes: Negative for photophobia and visual disturbance.   Respiratory: Positive for cough and shortness of breath. Negative for wheezing.     Cardiovascular: Negative for chest pain, palpitations and leg swelling.   Gastrointestinal: Negative for abdominal distention, abdominal pain, constipation, diarrhea, nausea and vomiting.   Genitourinary: Negative for dysuria.   Musculoskeletal: Negative for arthralgias and back pain.   Skin: Negative for pallor and rash.   Neurological: Positive for weakness and headaches. Negative for speech difficulty.   Psychiatric/Behavioral: Negative for agitation and confusion.       Physical Exam     Initial Vitals [11/23/19 0537]   BP Pulse Resp Temp SpO2   115/65 110 18 (!) 100.6 °F (38.1 °C) 97 %      MAP       --         Physical Exam    Constitutional:   Patient is a 38 yo F who appears stated age, alert, and appears ill.    HENT:   Head: Normocephalic and atraumatic.   Eyes: Conjunctivae and EOM are normal. Pupils are equal, round, and reactive to light.   Neck: Normal range of motion. No JVD present.   Cardiovascular: Normal rate and regular rhythm.   Pulmonary/Chest: No respiratory distress. She has no wheezes. She has no rales.   Abdominal: Soft. Bowel sounds are normal. She exhibits no distension. There is tenderness.   Musculoskeletal: Normal range of motion. She exhibits no edema.   Full strength in BL upper and lower extremities.    Neurological: She is alert and oriented to person, place, and time.   Skin: Skin is warm and dry.         ED Course   Procedures  Labs Reviewed   POCT INFLUENZA A/B MOLECULAR - Abnormal; Notable for the following components:       Result Value    POC Molecular Influenza B Ag Positive (*)     All other components within normal limits          Imaging Results    None          Medical Decision Making:   Initial Assessment:   Patient is a 38 yo F with a hx of DM, MG and HTN who presents to the ED with flu like symptoms including fevers, chills, myalgias, odynophagia, and decreased appetite.  She also endorses weakness and shortness of breath that worsens with activity.   She's noted to  be febrile to 100.6 and mildly tachycardic on presentation (111.)   On exam, she appears to have full strength BL, no upper eye lid droop or changes in vision.  She has minimal abdominal pain to palpation.   Differential Diagnosis:   DDx includes but is not limited to:  - Influenza, likely given course of illness and flu like symptoms.   - Myasthenia Gravis crisis, unlikely given normal strength on exam and no changes in vision   - Pneumonia, worsening respiratory symptoms, will consider post-viral pneumonia. R/o with CXR   - DKA, possible given increased respirations, dyspnea, and minimal abdominal pain  in the setting of a diabetic; will r/o with poct glucose    Independently Interpreted Test(s):   I have ordered and independently interpreted X-rays - see prior notes.  Clinical Tests:   Lab Tests: Ordered  Radiological Study: Ordered  ED Management:  Patient seen and examined.  Ordered acetaminophen and 1L bolus.   (+) influenza swab  Ordered poct glucose and CXR     6:59 AM  POCT came back at 98     8:02 AM  Patient re-examined. She does not feel as if the fluid bolus provided much relief.  She's noted to still be mildly tachycardic with  and febrile at 100.4.  Ordered Naproxen 500mg.      9:19 AM  CXR showed no consolidation or acute cardiopulmonary abnormality.   Will obtain basic labs CBC,BMP.  Ordered additional 500mL fluid bolus.     9:30 AM  BMP showed no electrolyte derangements or signs of an JORDAN. CBC showed no leukocytosis.   Patient's HR noted to decrease to 86. Recommended strong oral fluid repletion and rest.  Instructed patient to return to the ED or see her PCP if new symptoms developed or current symptoms continue to worsen.                     ED Course as of Nov 23 0625   Sat Nov 23, 2019   0611 POC Molecular Influenza B Ag(!): Positive [AP]      ED Course User Index  [AP] Dalia Teague MD                Clinical Impression:       ICD-10-CM ICD-9-CM   1. Influenza A J10.1 487.1                              Scotty Nicholson MD  Resident  11/23/19 0925

## 2019-11-23 NOTE — ED NOTES
Assumed care from VINCE Reyes.  Patient is lying in the bed upon arrival.  Patient is complaining of generalized body aches,

## 2019-11-23 NOTE — ED NOTES
Patient identifiers verified and correct.   LOC: The patient is awake, alert and aware of environment with an appropriate affect, the patient is oriented x 3 and speaking appropriately.     APPEARANCE: Patient appears comfortable and in no acute distress, patient is clean and well groomed.    SKIN: The skin is warm and dry, color consistent with ethnicity, patient has normal skin turgor and moist mucus membranes, skin intact, no breakdown or bruising noted.     MUSCULOSKELETAL: Patient moving all extremities spontaneously, no swelling noted. C/o generalized body aches and fever    RESPIRATORY: Airway is open and patent, respirations are spontaneous, patient has a normal effort and rate, no accessory muscle use noted, pt placed on continuous pulse ox with O2 sats noted at 97% on room air. C/o cough unchanged for past few days.    CARDIAC: Pt placed on cardiac monitor. Patient has a tachy rate and regular rhythm, no edema noted, capillary refill < 3 seconds.     GASTRO: Soft and non tender to palpation, no distention noted, normoactive bowel sounds present in all four quadrants. Pt states bowel movements have been regular.    : Pt denies any pain or frequency with urination.    NEURO: Pt opens eyes spontaneously, behavior appropriate to situation, follows commands, facial expression symmetrical, bilateral hand grasp equal and even, purposeful motor response noted, normal sensation in all extremities when touched with a finger.

## 2019-11-23 NOTE — ED TRIAGE NOTES
"Vicky Joy, a 39 y.o. female presents to the ED w/ complaint of     Triage note:  Chief Complaint   Patient presents with    Cough     cough, fever, body aches since wednesday, hx of MG an DM2       Pt recently diagnosed w/ Viral URI on 11/21, reports "I still feel the same.  But I can't take those pearls they gave me for my cough."      Review of patient's allergies indicates:   Allergen Reactions    Cellcept [mycophenolate mofetil]      Weakness (muscle)^     Past Medical History:   Diagnosis Date    Diabetes mellitus type II     Headache(784.0)     Hypertension     Myasthenia gravis without exacerbation     Obesity     Other and unspecified hyperlipidemia        "

## 2019-11-24 LAB — BACTERIA THROAT CULT: NORMAL

## 2019-11-29 ENCOUNTER — PATIENT OUTREACH (OUTPATIENT)
Dept: ADMINISTRATIVE | Facility: OTHER | Age: 39
End: 2019-11-29

## 2019-12-05 DIAGNOSIS — T38.0X5A STEROID-INDUCED DIABETES: ICD-10-CM

## 2019-12-05 DIAGNOSIS — E78.5 HYPERLIPIDEMIA, UNSPECIFIED HYPERLIPIDEMIA TYPE: ICD-10-CM

## 2019-12-05 DIAGNOSIS — E09.9 STEROID-INDUCED DIABETES: ICD-10-CM

## 2019-12-06 RX ORDER — ROSUVASTATIN CALCIUM 20 MG/1
TABLET, COATED ORAL
Qty: 90 TABLET | Refills: 3 | Status: SHIPPED | OUTPATIENT
Start: 2019-12-06 | End: 2020-12-07

## 2019-12-06 RX ORDER — GABAPENTIN 100 MG/1
CAPSULE ORAL
Qty: 270 CAPSULE | Refills: 3 | Status: SHIPPED | OUTPATIENT
Start: 2019-12-06 | End: 2021-12-17

## 2020-01-09 ENCOUNTER — HOSPITAL ENCOUNTER (EMERGENCY)
Facility: HOSPITAL | Age: 40
Discharge: HOME | End: 2020-01-09
Attending: EMERGENCY MEDICINE
Payer: MEDICARE

## 2020-01-09 VITALS
RESPIRATION RATE: 18 BRPM | BODY MASS INDEX: 29.57 KG/M2 | WEIGHT: 184 LBS | TEMPERATURE: 98 F | SYSTOLIC BLOOD PRESSURE: 108 MMHG | HEIGHT: 66 IN | OXYGEN SATURATION: 98 % | HEART RATE: 68 BPM | DIASTOLIC BLOOD PRESSURE: 58 MMHG

## 2020-01-09 DIAGNOSIS — N83.209 HEMORRHAGIC OVARIAN CYST: Primary | ICD-10-CM

## 2020-01-09 DIAGNOSIS — R10.30 LOWER ABDOMINAL PAIN: ICD-10-CM

## 2020-01-09 LAB
ALBUMIN SERPL BCP-MCNC: 3.8 G/DL (ref 3.5–5.2)
ALP SERPL-CCNC: 114 U/L (ref 55–135)
ALT SERPL W/O P-5'-P-CCNC: 17 U/L (ref 10–44)
ANION GAP SERPL CALC-SCNC: 8 MMOL/L (ref 8–16)
AST SERPL-CCNC: 16 U/L (ref 10–40)
B-HCG UR QL: NEGATIVE
BASOPHILS # BLD AUTO: 0.04 K/UL (ref 0–0.2)
BASOPHILS NFR BLD: 0.3 % (ref 0–1.9)
BILIRUB SERPL-MCNC: 0.4 MG/DL (ref 0.1–1)
BILIRUB UR QL STRIP: NEGATIVE
BUN SERPL-MCNC: 7 MG/DL (ref 6–20)
CALCIUM SERPL-MCNC: 9.4 MG/DL (ref 8.7–10.5)
CHLORIDE SERPL-SCNC: 105 MMOL/L (ref 95–110)
CLARITY UR REFRACT.AUTO: ABNORMAL
CO2 SERPL-SCNC: 25 MMOL/L (ref 23–29)
COLOR UR AUTO: YELLOW
CREAT SERPL-MCNC: 0.8 MG/DL (ref 0.5–1.4)
CTP QC/QA: YES
DIFFERENTIAL METHOD: ABNORMAL
EOSINOPHIL # BLD AUTO: 0 K/UL (ref 0–0.5)
EOSINOPHIL NFR BLD: 0.1 % (ref 0–8)
ERYTHROCYTE [DISTWIDTH] IN BLOOD BY AUTOMATED COUNT: 15.7 % (ref 11.5–14.5)
EST. GFR  (AFRICAN AMERICAN): >60 ML/MIN/1.73 M^2
EST. GFR  (NON AFRICAN AMERICAN): >60 ML/MIN/1.73 M^2
GLUCOSE SERPL-MCNC: 166 MG/DL (ref 70–110)
GLUCOSE UR QL STRIP: NEGATIVE
HCT VFR BLD AUTO: 35.2 % (ref 37–48.5)
HGB BLD-MCNC: 11.2 G/DL (ref 12–16)
HGB UR QL STRIP: NEGATIVE
IMM GRANULOCYTES # BLD AUTO: 0.08 K/UL (ref 0–0.04)
IMM GRANULOCYTES NFR BLD AUTO: 0.5 % (ref 0–0.5)
KETONES UR QL STRIP: NEGATIVE
LEUKOCYTE ESTERASE UR QL STRIP: NEGATIVE
LIPASE SERPL-CCNC: 4 U/L (ref 4–60)
LYMPHOCYTES # BLD AUTO: 1 K/UL (ref 1–4.8)
LYMPHOCYTES NFR BLD: 6.4 % (ref 18–48)
MCH RBC QN AUTO: 31.2 PG (ref 27–31)
MCHC RBC AUTO-ENTMCNC: 31.8 G/DL (ref 32–36)
MCV RBC AUTO: 98 FL (ref 82–98)
MONOCYTES # BLD AUTO: 0.6 K/UL (ref 0.3–1)
MONOCYTES NFR BLD: 4.1 % (ref 4–15)
NEUTROPHILS # BLD AUTO: 13.7 K/UL (ref 1.8–7.7)
NEUTROPHILS NFR BLD: 88.6 % (ref 38–73)
NITRITE UR QL STRIP: NEGATIVE
NRBC BLD-RTO: 0 /100 WBC
PH UR STRIP: 8 [PH] (ref 5–8)
PLATELET # BLD AUTO: 270 K/UL (ref 150–350)
PMV BLD AUTO: 12.7 FL (ref 9.2–12.9)
POTASSIUM SERPL-SCNC: 3.9 MMOL/L (ref 3.5–5.1)
PROT SERPL-MCNC: 7.5 G/DL (ref 6–8.4)
PROT UR QL STRIP: NEGATIVE
RBC # BLD AUTO: 3.59 M/UL (ref 4–5.4)
SODIUM SERPL-SCNC: 138 MMOL/L (ref 136–145)
SP GR UR STRIP: 1.01 (ref 1–1.03)
URN SPEC COLLECT METH UR: ABNORMAL
WBC # BLD AUTO: 15.41 K/UL (ref 3.9–12.7)

## 2020-01-09 PROCEDURE — 81003 URINALYSIS AUTO W/O SCOPE: CPT

## 2020-01-09 PROCEDURE — 25500020 PHARM REV CODE 255: Performed by: EMERGENCY MEDICINE

## 2020-01-09 PROCEDURE — 96374 THER/PROPH/DIAG INJ IV PUSH: CPT | Mod: 59

## 2020-01-09 PROCEDURE — 25000003 PHARM REV CODE 250: Performed by: EMERGENCY MEDICINE

## 2020-01-09 PROCEDURE — 85025 COMPLETE CBC W/AUTO DIFF WBC: CPT

## 2020-01-09 PROCEDURE — 81025 URINE PREGNANCY TEST: CPT | Performed by: EMERGENCY MEDICINE

## 2020-01-09 PROCEDURE — 99285 EMERGENCY DEPT VISIT HI MDM: CPT | Mod: 25

## 2020-01-09 PROCEDURE — 63600175 PHARM REV CODE 636 W HCPCS: Performed by: EMERGENCY MEDICINE

## 2020-01-09 PROCEDURE — 99285 PR EMERGENCY DEPT VISIT,LEVEL V: ICD-10-PCS | Mod: ,,, | Performed by: EMERGENCY MEDICINE

## 2020-01-09 PROCEDURE — 83690 ASSAY OF LIPASE: CPT

## 2020-01-09 PROCEDURE — 96361 HYDRATE IV INFUSION ADD-ON: CPT

## 2020-01-09 PROCEDURE — 96375 TX/PRO/DX INJ NEW DRUG ADDON: CPT

## 2020-01-09 PROCEDURE — 80053 COMPREHEN METABOLIC PANEL: CPT

## 2020-01-09 PROCEDURE — 99285 EMERGENCY DEPT VISIT HI MDM: CPT | Mod: ,,, | Performed by: EMERGENCY MEDICINE

## 2020-01-09 RX ORDER — HYDROCODONE BITARTRATE AND ACETAMINOPHEN 5; 325 MG/1; MG/1
1 TABLET ORAL EVERY 6 HOURS PRN
Qty: 8 TABLET | Refills: 0 | Status: SHIPPED | OUTPATIENT
Start: 2020-01-09 | End: 2020-03-05

## 2020-01-09 RX ORDER — HYDROCODONE BITARTRATE AND ACETAMINOPHEN 5; 325 MG/1; MG/1
1 TABLET ORAL
Status: COMPLETED | OUTPATIENT
Start: 2020-01-09 | End: 2020-01-09

## 2020-01-09 RX ORDER — ONDANSETRON 2 MG/ML
4 INJECTION INTRAMUSCULAR; INTRAVENOUS
Status: COMPLETED | OUTPATIENT
Start: 2020-01-09 | End: 2020-01-09

## 2020-01-09 RX ORDER — MORPHINE SULFATE 4 MG/ML
4 INJECTION, SOLUTION INTRAMUSCULAR; INTRAVENOUS
Status: COMPLETED | OUTPATIENT
Start: 2020-01-09 | End: 2020-01-09

## 2020-01-09 RX ADMIN — SODIUM CHLORIDE 1000 ML: 0.9 INJECTION, SOLUTION INTRAVENOUS at 01:01

## 2020-01-09 RX ADMIN — HYDROCODONE BITARTRATE AND ACETAMINOPHEN 1 TABLET: 5; 325 TABLET ORAL at 05:01

## 2020-01-09 RX ADMIN — IOHEXOL 100 ML: 350 INJECTION, SOLUTION INTRAVENOUS at 03:01

## 2020-01-09 RX ADMIN — MORPHINE SULFATE 4 MG: 4 INJECTION, SOLUTION INTRAMUSCULAR; INTRAVENOUS at 02:01

## 2020-01-09 RX ADMIN — ONDANSETRON 4 MG: 2 INJECTION INTRAMUSCULAR; INTRAVENOUS at 01:01

## 2020-01-09 NOTE — ED TRIAGE NOTES
"Vicky Joy, an 39 y.o. female presents to the ED c/o abdominal pain, nausea vomiting diarrhea 4x and dysuria onset today. Pt denies blood states it was "juice".    Chief Complaint   Patient presents with    Abdominal Pain     abdominal cramping, NVD today. "throwing up red," hx of MG     Review of patient's allergies indicates:   Allergen Reactions    Cellcept [mycophenolate mofetil]      Weakness (muscle)^     Past Medical History:   Diagnosis Date    Diabetes mellitus type II     Headache(784.0)     Hypertension     Myasthenia gravis without exacerbation     Obesity     Other and unspecified hyperlipidemia        LOC: The patient is awake, alert, aware of environment with an appropriate affect. Oriented x4, speaking appropriately  APPEARANCE: Pt resting comfortably, in no acute distress, pt is clean and well groomed, clothing properly fastened  SKIN:The skin is warm and dry, color consistent with ethnicity, patient has normal skin turgor and moist mucus membranes  RESPIRATORY:Airway is open and patent, respirations are spontaneous, patient has a normal effort and rate, no accessory muscle use noted.  CARDIAC: Normal rate and rhythm, no peripheral edema noted, capillary refill < 3 seconds, bilateral radial pulses 2+.  ABDOMEN: Soft,  tender, distended. +N/V/D  NEUROLOGIC: PERRLA, facial expression is symmetrical, patient moving all extremities spontaneously, normal sensation in all extremities when touched with a finger.  Follows all commands appropriately  MUSCULOSKELETAL: Patient moving all extremities spontaneously, no obvious swelling or deformities noted.         "

## 2020-01-09 NOTE — PROGRESS NOTES
[place] of care at 3:00 a.m. due to and of shift.    39-year-old woman with comorbidities of myasthenia gravis presenting with 1 day of abdominal cramping with associated vomiting and diarrhea.  CT scan pending at time of transfer.  Radiographic findings are significant for a suspected ruptured left hemorrhagic cyst originating from left ovary without additional intra-abdominal injury or pathology.  Patient endorses mild-to-moderate ongoing abdominal discomfort with resolution of nausea and urge to stool.  A Norco by mouth has been administered with mild improvement of pain.  She will be discharged home in improved condition with prescription for Norco # 8 tablets to be taken for the next 2 days as needed for pain and OTC acetaminophen there after.  A referral to gynecology has been placed by me and I recommended that she follow up with gyn within the next 7 days and return to the ED as needed for urgent concerns.

## 2020-01-09 NOTE — ED PROVIDER NOTES
"Encounter Date: 1/9/2020       History     Chief Complaint   Patient presents with    Abdominal Pain     abdominal cramping, NVD today. "throwing up red," hx of MG     HPI     39-year-old female past medical history of hypertension, obesity, diabetes, myasthenia gravis, coming in with 1 day of abdominal pain. Pain is moderate to severe cramping in nature.  She also endorses some nonbloody diarrhea x3 as well as some emesis x3. She states the red in her vomit was secondary to juice.  She endorses some discomfort in the abdomen when urinating but no burning with urination or frequency, she denies any vaginal symptoms or abnormal vaginal discharge. No obvious sick contacts.  No previous past similar symptoms. No previous abdominal surgeries.  No fevers.    Review of patient's allergies indicates:   Allergen Reactions    Cellcept [mycophenolate mofetil]      Weakness (muscle)^     Past Medical History:   Diagnosis Date    Diabetes mellitus type II     Headache(784.0)     Hypertension     Myasthenia gravis without exacerbation     Obesity     Other and unspecified hyperlipidemia      Past Surgical History:   Procedure Laterality Date    SPINE SURGERY      THYMECTOMY       Family History   Problem Relation Age of Onset    Cancer Mother         breast cancer survivor, age 62    Diabetes Father      Social History     Tobacco Use    Smoking status: Never Smoker    Smokeless tobacco: Never Used   Substance Use Topics    Alcohol use: No    Drug use: No     Review of Systems   Constitutional:  No Fever  Eyes: No Vision Changes  ENT/Mouth: No sore throat, No rhinorrhea  Cardiovascular:  No Chest Pain  Respiratory:  No Cough, No SOB  Gastrointestinal:  Positive vomiting diarrhea, abdominal pain.  Genitourinary:  Positive abdominal pain with urination, but no frequency  Musculoskeletal:  No Arthralgias, No Back Pain, No Neck Pain, No recent trauma.  Skin:  No skin Lesions  Neuro:  No Weakness, No Dizziness, No " Headache        Physical Exam     Initial Vitals [01/09/20 0017]   BP Pulse Resp Temp SpO2   129/73 89 18 98 °F (36.7 °C) 99 %      MAP       --         Physical Exam   Physical Exam:  CONSTITUTIONAL: Well developed, well nourished, in no acute distress, tired appearing  HENT: Normocephalic, atraumatic    EYES: Sclerae anicteric   NECK: Supple, no thyroid enlargement  CARDIOVASCULAR: Regular rate and rhythm without any murmurs, gallops, rubs.  RESPIRATORY: Speaking in full sentences. Breathing comfortably. Auscultation of the lungs revealed normal breath sounds b/l, no wheezing, no rales, no rhonchi.  ABDOMEN: Soft with right greater than left tenderness, more pronounced in the lower abdomen.  NEUROLOGIC: Alert, interacting normally. No facial droop.   MSK: Moving all four extremities.  Skin: Warm and dry. No visible rash on exposed areas of skin.    Psych: Mood and affect normal.       ED Course   Procedures  Labs Reviewed   CBC W/ AUTO DIFFERENTIAL - Abnormal; Notable for the following components:       Result Value    WBC 15.41 (*)     RBC 3.59 (*)     Hemoglobin 11.2 (*)     Hematocrit 35.2 (*)     Mean Corpuscular Hemoglobin 31.2 (*)     Mean Corpuscular Hemoglobin Conc 31.8 (*)     RDW 15.7 (*)     Gran # (ANC) 13.7 (*)     Immature Grans (Abs) 0.08 (*)     Gran% 88.6 (*)     Lymph% 6.4 (*)     All other components within normal limits   COMPREHENSIVE METABOLIC PANEL   LIPASE   URINALYSIS, REFLEX TO URINE CULTURE   POCT URINE PREGNANCY          Imaging Results    None          Medical Decision Making:   Initial Assessment:   39-year-old female with past medical history as noted coming in with 1 day of right greater than left abdominal pain, accompanied with vomiting and diarrhea.  Abdomen is concerning for a tender abdomen in the right upper quadrant right lower quadrant and left lower quadrant.    Will undertake workup with set of labs, including lipase and LFTs.  Will control symptoms with IV fluids and  Zofran.    Given patient's abdominal tenderness will undertake CT abdomen pelvis to look for any appendicitis or diverticulitis biliary pathology or other dangerous abdominal pathology.    Will undertake urine studies given lower abdominal pain, UPT.    Patient is myasthenia gravis and will avoid giving medications that may exacerbate her myasthenia.  No obvious myasthenia exacerbation at this time.  ED Management:  Update 2:49 AM  Continues to be in pain. Will give dose of pain medications.  Labs are significant for elevated white blood cell count otherwise unremarkable.  Pregnancy test is negative, urine studies are unremarkable  Is being sent to CT scan now after UPT is returned.    Signed out to Dr. Judge pending CT abdomen pelvis and re-evaluation.    MDM Complexity Points:   Problem Points:  1.New problem, with no additional ED work-up planned (maximum of 1) - abdominal pain, vomiting, diarrhea.    Data Points:  Review or order clinical lab tests, Review or order radiology test and Decision to obtain old records (in the EHR)                                     Clinical Impression:   No diagnosis found.                          Yosef Tyson MD  01/09/20 6694

## 2020-01-13 ENCOUNTER — PATIENT OUTREACH (OUTPATIENT)
Dept: ADMINISTRATIVE | Facility: OTHER | Age: 40
End: 2020-01-13

## 2020-01-15 ENCOUNTER — OFFICE VISIT (OUTPATIENT)
Dept: OBSTETRICS AND GYNECOLOGY | Facility: CLINIC | Age: 40
End: 2020-01-15
Payer: MEDICARE

## 2020-01-15 VITALS
BODY MASS INDEX: 28.34 KG/M2 | DIASTOLIC BLOOD PRESSURE: 63 MMHG | HEIGHT: 66 IN | WEIGHT: 176.38 LBS | SYSTOLIC BLOOD PRESSURE: 112 MMHG

## 2020-01-15 DIAGNOSIS — N83.202 LEFT OVARIAN CYST: Primary | ICD-10-CM

## 2020-01-15 DIAGNOSIS — N92.0 SPOTTING: ICD-10-CM

## 2020-01-15 LAB
C TRACH DNA SPEC QL NAA+PROBE: NOT DETECTED
N GONORRHOEA DNA SPEC QL NAA+PROBE: NOT DETECTED

## 2020-01-15 PROCEDURE — 87661 TRICHOMONAS VAGINALIS AMPLIF: CPT

## 2020-01-15 PROCEDURE — 99214 PR OFFICE/OUTPT VISIT, EST, LEVL IV, 30-39 MIN: ICD-10-PCS | Mod: S$GLB,,, | Performed by: OBSTETRICS & GYNECOLOGY

## 2020-01-15 PROCEDURE — 87481 CANDIDA DNA AMP PROBE: CPT | Mod: 59

## 2020-01-15 PROCEDURE — 99214 OFFICE O/P EST MOD 30 MIN: CPT | Mod: S$GLB,,, | Performed by: OBSTETRICS & GYNECOLOGY

## 2020-01-15 PROCEDURE — 87591 N.GONORRHOEAE DNA AMP PROB: CPT

## 2020-01-15 RX ORDER — PYRIDOSTIGMINE BROMIDE 180 MG/1
180 TABLET, EXTENDED RELEASE ORAL DAILY
COMMUNITY
Start: 2019-12-14 | End: 2020-06-17

## 2020-01-15 NOTE — LETTER
January 15, 2020      [facility] [address] [place] [address]  Phone: [phone]  Fax: [phone]       Patient: Vicky Joy   YOB: 1980  Date of Visit: 01/15/2020    To Whom It May Concern:    Mónica Joy  was at [facility] on 01/15/2020. She may return to work on 1/16/2020 with no restrictions. If you have any questions or concerns, or if I can be of further assistance, please do not hesitate to contact me.    Sincerely,        Jami Joy MA

## 2020-01-15 NOTE — PROGRESS NOTES
CC: 40 yo  female, here for fu visit    HPI: Vicky is overall well today.  She is a  s/p . Last pap smear was in 2019 NILM Denies history of abnormal pap smears. Cycle finished on Friday, now having vulvar irritation. Unsure if related to pad use. Kids are 13,11 and 8. Regular cycles, not sexually active. Has type 2 DM and A1C is 6.2.     Recently seen in the ED for abdominal pain. CT scan showed ovarian cyst. Last cycle was early December. Now spotting. Negative UPT in ED. SA, using withdrawal method. Declines pelvic exam today, but willing to do swabs herself. Abdominal pain slightly improved.     Past Medical History:   Diagnosis Date    Diabetes mellitus type II     Headache(784.0)     Hypertension     Myasthenia gravis without exacerbation     Obesity     Other and unspecified hyperlipidemia        Past Surgical History:   Procedure Laterality Date    SPINE SURGERY      THYMECTOMY         OB History        3    Para   3    Term                AB        Living           SAB        TAB        Ectopic        Multiple        Live Births                     Current Outpatient Medications on File Prior to Visit   Medication Sig Dispense Refill    azaTHIOprine (IMURAN) 50 mg Tab TAKE 2 TABLETS (100 MG TOTAL) BY MOUTH 2 (TWO) TIMES DAILY. 360 tablet 3    blood sugar diagnostic Strp 1 strip by Misc.(Non-Drug; Combo Route) route 2 (two) times daily before meals. 100 strip 3    calcium carbonate (OS-MARISOL) 500 mg calcium (1,250 mg) tablet Take 1 tablet (500 mg total) by mouth 2 (two) times daily. 180 tablet 3    gabapentin (NEURONTIN) 100 MG capsule TAKE 1 CAPSULE BY MOUTH THREE TIMES A  capsule 3    glipiZIDE (GLUCOTROL) 10 MG tablet TAKE 1 TABLET BY MOUTH DAILY WITH BREAKFAST 90 tablet 3    HYDROcodone-acetaminophen (NORCO) 5-325 mg per tablet Take 1 tablet by mouth every 6 (six) hours as needed for Pain. 8 tablet 0    lisinopril 10 MG tablet TAKE 1 TABLET BY MOUTH EVERY DAY  "90 tablet 3    metformin (GLUCOPHAGE) 1000 MG tablet Take 1 tablet (1,000 mg total) by mouth 2 (two) times daily with meals. 180 tablet 3    nystatin (MYCOSTATIN) cream Apply topically 2 (two) times daily. FOR YEAST INFECTION UNDER BREASTS 60 g 3    omeprazole (PRILOSEC) 20 MG capsule Take 20 mg by mouth once daily.  2    pantoprazole (PROTONIX) 40 MG tablet       predniSONE (DELTASONE) 10 MG tablet Take 2 tablets (20 mg total) by mouth once daily. (Patient taking differently: Take 10 mg by mouth once daily. ) 180 tablet 3    pyridostigmine (MESTINON) 60 mg Tab Take 0.5 tablets (30 mg total) by mouth every 4 (four) hours. 270 tablet 3    rosuvastatin (CRESTOR) 20 MG tablet TAKE 1 TABLET BY MOUTH EVERY DAY 90 tablet 3    vitamin D 1000 units Tab Take 185 mg by mouth once daily.       No current facility-administered medications on file prior to visit.          ROS:  GENERAL: Feeling well overall.   SKIN: Denies rash or lesions.   HEAD: Denies head injury or headache.   CHEST: Denies chest pain or shortness of breath.   CARDIOVASCULAR: Denies palpitations or left sided chest pain.   ABDOMEN: see HPI  URINARY: No frequency, dysuria, hematuria or burning on urination.  REPRODUCTIVE: See HPI.   HEMATOLOGIC: No easy bruisability or excessive bleeding.   MUSCULOSKELETAL: Denies joint pain or swelling.   NEUROLOGIC: Denies syncope or weakness.   PSYCHIATRIC: Denies depression, anxiety or mood swings.    Physical Exam:   /63   Ht 5' 6" (1.676 m)   Wt 80 kg (176 lb 5.9 oz)   BMI 28.47 kg/m²    LMP   General: No distress, well appearing  HEENT: normocephalic, atraumatic   Heart: Regular rate  Lungs: No increased work of breathing  Abdomen: soft, nontender, no masses  MS: lower extremeties symmetrical, no edema  Pelvic Exam:  Deferred  PSYCH: Normal affect, mood appropriate     1/9/20 CT SCAN    Peritoneal Space/reproductive organs: Mixed density fluid layering in the pelvis and tracking in the left pericolic " gutter most consistent with hemoperitoneum.  Small volume perihepatic free fluid as well.  Findings could be related to a ruptured hemorrhagic ovarian cyst, possibly originating from the left adnexa as the left ovary is not well delineated from abnormal mixed density in the left adnexa which is likely largely composed of blood products (axial image 74).  The right ovary appears unremarkable.    ASSESSMENT/PLAN: 37 yo  here for follow up ED visit, abdominal pain. Likely secondary to ruptured ovarian cyst. Pain slowly improving.     1. Follow up US in 6 weeks to evaluate adnexal cyst  2. She also has spotting today. UPT negative in ED. Declined pelvic exam, but given option to do GC/CT and affirm herself - which she did. She will keep track of her cycles and let me know. If AUB continues, can do EMB.   3. Withdrawal method for contraception, declines other form  4. Pap up to date    Pattie Bowen MD  Obstetrics and Gynecology  [facility]

## 2020-01-16 RX ORDER — METRONIDAZOLE 500 MG/1
500 TABLET ORAL EVERY 12 HOURS
Qty: 14 TABLET | Refills: 0 | Status: SHIPPED | OUTPATIENT
Start: 2020-01-16 | End: 2020-01-23

## 2020-03-02 DIAGNOSIS — E11.9 TYPE 2 DIABETES MELLITUS WITHOUT COMPLICATION: ICD-10-CM

## 2020-03-05 ENCOUNTER — CLINICAL SUPPORT (OUTPATIENT)
Dept: OPTOMETRY | Facility: CLINIC | Age: 40
End: 2020-03-05
Attending: INTERNAL MEDICINE
Payer: MEDICARE

## 2020-03-05 ENCOUNTER — OFFICE VISIT (OUTPATIENT)
Dept: INTERNAL MEDICINE | Facility: CLINIC | Age: 40
End: 2020-03-05
Payer: MEDICARE

## 2020-03-05 ENCOUNTER — PATIENT MESSAGE (OUTPATIENT)
Dept: INTERNAL MEDICINE | Facility: CLINIC | Age: 40
End: 2020-03-05

## 2020-03-05 VITALS
OXYGEN SATURATION: 99 % | HEIGHT: 66 IN | HEART RATE: 66 BPM | SYSTOLIC BLOOD PRESSURE: 132 MMHG | WEIGHT: 178.13 LBS | DIASTOLIC BLOOD PRESSURE: 88 MMHG | BODY MASS INDEX: 28.63 KG/M2

## 2020-03-05 DIAGNOSIS — E09.9 STEROID-INDUCED DIABETES: ICD-10-CM

## 2020-03-05 DIAGNOSIS — T38.0X5A STEROID-INDUCED DIABETES: ICD-10-CM

## 2020-03-05 DIAGNOSIS — L74.9 SWEATING ABNORMALITY: ICD-10-CM

## 2020-03-05 DIAGNOSIS — I15.2 HYPERTENSION ASSOCIATED WITH DIABETES: ICD-10-CM

## 2020-03-05 DIAGNOSIS — E11.59 HYPERTENSION ASSOCIATED WITH DIABETES: ICD-10-CM

## 2020-03-05 DIAGNOSIS — G70.00 MG (MYASTHENIA GRAVIS): Primary | ICD-10-CM

## 2020-03-05 DIAGNOSIS — N83.209 CYST OF OVARY, UNSPECIFIED LATERALITY: ICD-10-CM

## 2020-03-05 PROCEDURE — 92250 FUNDUS PHOTOGRAPHY W/I&R: CPT | Mod: 26,S$GLB,, | Performed by: OPHTHALMOLOGY

## 2020-03-05 PROCEDURE — 92250 DIABETIC EYE SCREENING PHOTO: ICD-10-PCS | Mod: 26,S$GLB,, | Performed by: OPHTHALMOLOGY

## 2020-03-05 PROCEDURE — 99214 OFFICE O/P EST MOD 30 MIN: CPT | Mod: S$GLB,,, | Performed by: INTERNAL MEDICINE

## 2020-03-05 PROCEDURE — 99214 PR OFFICE/OUTPT VISIT, EST, LEVL IV, 30-39 MIN: ICD-10-PCS | Mod: S$GLB,,, | Performed by: INTERNAL MEDICINE

## 2020-03-05 PROCEDURE — 99999 PR PBB SHADOW E&M-EST. PATIENT-LVL I: CPT | Mod: PBBFAC,,,

## 2020-03-05 PROCEDURE — 99999 PR PBB SHADOW E&M-EST. PATIENT-LVL I: ICD-10-PCS | Mod: PBBFAC,,,

## 2020-03-05 PROCEDURE — 99999 PR PBB SHADOW E&M-EST. PATIENT-LVL III: ICD-10-PCS | Mod: PBBFAC,,, | Performed by: INTERNAL MEDICINE

## 2020-03-05 PROCEDURE — 99999 PR PBB SHADOW E&M-EST. PATIENT-LVL III: CPT | Mod: PBBFAC,,, | Performed by: INTERNAL MEDICINE

## 2020-03-05 RX ORDER — GLIPIZIDE 5 MG/1
5 TABLET ORAL
Qty: 90 TABLET | Refills: 3 | Status: SHIPPED | OUTPATIENT
Start: 2020-03-05 | End: 2021-03-11

## 2020-03-05 NOTE — PROGRESS NOTES
"Subjective:       Patient ID: Vicky Joy is a 39 y.o. female.    Chief Complaint: Follow-up    HPI - Vicky feels well today.  She has been having asymptomatic low sugars - seeing some in the 40's.  MG is "adequately" controlled per patient, though she has trouble using her arms above her head to brush her hair.  She was recently in the ER d/t abdominal pain from a ruptured ovarian cyst.  She has been having malodorous sweating on her feet and under her arms.  Wonders if she could see derm to discuss that.  Not a smoker    PMH:  Myasthenia gravis on steroids long term  Steroid induced DM  HTN  Fibromyalgia  HLP on a statin     Meds:  Reviewed and reconciled in EPIC with patient during visit today.     Review of Systems   Constitutional: Negative for fever.   HENT: Negative for congestion.    Respiratory: Negative for shortness of breath.    Cardiovascular: Negative for chest pain.   Gastrointestinal: Negative for abdominal pain.   Genitourinary: Negative for difficulty urinating.   Musculoskeletal: Negative for arthralgias.   Skin: Negative for rash.   Neurological: Negative for headaches.   Psychiatric/Behavioral: Negative for sleep disturbance.       Objective:      Physical Exam   Constitutional: She is oriented to person, place, and time. She appears well-developed and well-nourished.   [place] hump and moon facies   HENT:   Head: Normocephalic and atraumatic.   Cardiovascular: Normal rate, regular rhythm and normal heart sounds. Exam reveals no gallop and no friction rub.   No murmur heard.  Pulmonary/Chest: Effort normal and breath sounds normal. No respiratory distress. She has no wheezes. She has no rales. She exhibits no tenderness.   Neurological: She is alert and oriented to person, place, and time.   Skin: Skin is warm and dry. No erythema.   Psychiatric: She has a normal mood and affect.   Nursing note and vitals reviewed.      Assessment:       1. MG (myasthenia gravis)    2. Steroid-induced " diabetes    3. Hypertension associated with diabetes    4. Cyst of ovary, unspecified laterality    5. Sweating abnormality        Plan:       Vicky was seen today for follow-up.    Diagnoses and all orders for this visit:    MG (myasthenia gravis) - stable, followed by neurology    Steroid-induced diabetes - seems over-treated.  Wait for a1c before adjusting meds further  -     Hemoglobin A1c; Future  -     Diabetic Eye Screening Photo; Future  -     HIV 1/2 Ag/Ab (4th Gen); Future    Hypertension associated with diabetes - at goal, stay the course  -     Comprehensive metabolic panel; Future    Cyst of ovary, unspecified laterality    Sweating abnormality - sometimes this is medication-related, but I don't see one on her list.  Asking derm to give an opinion  -     Ambulatory referral/consult to Dermatology; Future    rtc prn, or in 6 months    LEE Razo MD MPH  Staff Internist

## 2020-03-05 NOTE — PROGRESS NOTES
HPI     Diabetic Eye Exam      Additional comments: photos              Comments     Screening photos          Last edited by La Remy MA on 3/5/2020  9:54 AM. (History)            Assessment /Plan     For exam results, see Encounter Report.    Steroid-induced diabetes  -     Diabetic Eye Screening Photo      39 y.o. y/o here for screening for Diabetic Renopathy with non-dilated fundus photos per Nirmal Razo Ii, MD

## 2020-03-11 ENCOUNTER — TELEPHONE (OUTPATIENT)
Dept: OPHTHALMOLOGY | Facility: CLINIC | Age: 40
End: 2020-03-11

## 2020-03-11 NOTE — TELEPHONE ENCOUNTER
Called patient regarding diabetic eye screening result    lvm No Background Diabetic Retinopathy. Follow up in 12 months.

## 2020-03-16 ENCOUNTER — TELEPHONE (OUTPATIENT)
Dept: OPHTHALMOLOGY | Facility: CLINIC | Age: 40
End: 2020-03-16

## 2020-03-16 NOTE — TELEPHONE ENCOUNTER
Called patient regarding diabetic eye screening results ; gave patient her results     ----- Message from Vince Carlton sent at 3/16/2020  8:24 AM CDT -----  Contact: pt @ [phone]  Pt calling to get results of diabetic eyecam

## 2020-03-27 ENCOUNTER — PATIENT MESSAGE (OUTPATIENT)
Dept: DERMATOLOGY | Facility: CLINIC | Age: 40
End: 2020-03-27

## 2020-05-04 ENCOUNTER — TELEPHONE (OUTPATIENT)
Dept: INTERNAL MEDICINE | Facility: CLINIC | Age: 40
End: 2020-05-04

## 2020-05-04 NOTE — TELEPHONE ENCOUNTER
----- Message from Vince Cartlon sent at 5/4/2020  1:39 PM CDT -----  Contact: Griselda lee/ [address] @ [phone], ext [phone]  Griselda is calling to confirm order faxed on 05/01/20 was received

## 2020-06-01 ENCOUNTER — TELEPHONE (OUTPATIENT)
Dept: INTERNAL MEDICINE | Facility: CLINIC | Age: 40
End: 2020-06-01

## 2020-06-01 NOTE — TELEPHONE ENCOUNTER
----- Message from Bailey Khan sent at 6/1/2020  1:00 PM CDT -----  Contact: Self [phone]  Patient would like an call back form the nurse in regards to pre-op clearance, please advise.

## 2020-06-01 NOTE — TELEPHONE ENCOUNTER
Please call Ms Joy.  We need to do the perioperative evaluation within 30 days of her surgery.  It's too early.    Thanks.  D

## 2020-06-01 NOTE — TELEPHONE ENCOUNTER
Called pt and advised her to call us 30 days prior to surgery to get an appt for evaluation.  Pt verbalized understanding.    Emma

## 2020-06-01 NOTE — TELEPHONE ENCOUNTER
Pt is requesting for a clearance to have liposuction surgery with a provider out of town somewhere in this year around December.    Please advise.    Emma

## 2020-06-16 DIAGNOSIS — G70.00 MG (MYASTHENIA GRAVIS): ICD-10-CM

## 2020-06-16 NOTE — TELEPHONE ENCOUNTER
----- Message from Angela Blair sent at 6/16/2020 11:00 AM CDT -----  Contact: Patient  Requesting an RX refill or new RX.  Is this a refill or new RX:  refill  RX name and strength: pyridostigmine (MESTINON) 180 mg TbSR  Directions (copy/paste from chart):    Is this a 30 day or 90 day RX:    Local pharmacy or mail order pharmacy:  local  Pharmacy name and phone # (copy/paste from chart):   [facility]/pharmacy #[id number] - [place], [place] - [address] [phone] (Phone)  [phone] (Fax)      Comments:          Requesting an RX refill or new RX.  Is this a refill or new RX:  refill  RX name and strength: pyridostigmine (MESTINON) 60 mg TbSR  Directions (copy/paste from chart):    Is this a 30 day or 90 day RX:    Local pharmacy or mail order pharmacy:  local  Pharmacy name and phone # (copy/paste from chart):   [facility]/pharmacy #[id number] - [place] - [address] [phone] (Phone)  [phone] (Fax)      Comments:

## 2020-06-17 RX ORDER — PYRIDOSTIGMINE BROMIDE 60 MG/1
30 TABLET ORAL EVERY 4 HOURS
Qty: 270 TABLET | Refills: 3 | Status: SHIPPED | OUTPATIENT
Start: 2020-06-17 | End: 2021-05-24

## 2020-06-17 RX ORDER — PYRIDOSTIGMINE BROMIDE 180 MG/1
TABLET, EXTENDED RELEASE ORAL
Qty: 90 TABLET | Refills: 3 | Status: SHIPPED | OUTPATIENT
Start: 2020-06-17 | End: 2021-05-24

## 2020-08-25 ENCOUNTER — OFFICE VISIT (OUTPATIENT)
Dept: URGENT CARE | Facility: CLINIC | Age: 40
End: 2020-08-25
Payer: MEDICARE

## 2020-08-25 VITALS
HEIGHT: 66 IN | BODY MASS INDEX: 29.57 KG/M2 | RESPIRATION RATE: 19 BRPM | HEART RATE: 109 BPM | SYSTOLIC BLOOD PRESSURE: 129 MMHG | OXYGEN SATURATION: 98 % | TEMPERATURE: 101 F | WEIGHT: 184 LBS | DIASTOLIC BLOOD PRESSURE: 83 MMHG

## 2020-08-25 DIAGNOSIS — B34.9 VIRAL SYNDROME: ICD-10-CM

## 2020-08-25 DIAGNOSIS — U07.1 COVID-19 VIRUS INFECTION: Primary | ICD-10-CM

## 2020-08-25 LAB
CTP QC/QA: YES
SARS-COV-2 RDRP RESP QL NAA+PROBE: POSITIVE

## 2020-08-25 PROCEDURE — 99203 PR OFFICE/OUTPT VISIT, NEW, LEVL III, 30-44 MIN: ICD-10-PCS | Mod: S$GLB,,, | Performed by: PHYSICIAN ASSISTANT

## 2020-08-25 PROCEDURE — 99203 OFFICE O/P NEW LOW 30 MIN: CPT | Mod: S$GLB,,, | Performed by: PHYSICIAN ASSISTANT

## 2020-08-25 PROCEDURE — U0002 COVID-19 LAB TEST NON-CDC: HCPCS | Mod: S$GLB,,, | Performed by: PHYSICIAN ASSISTANT

## 2020-08-25 PROCEDURE — U0002: ICD-10-PCS | Mod: S$GLB,,, | Performed by: PHYSICIAN ASSISTANT

## 2020-08-25 NOTE — PROGRESS NOTES
"Subjective:       Patient ID: Vicky Joy is a 40 y.o. female.    Vitals:  height is 5' 6" (1.676 m) and weight is 83.5 kg (184 lb). Her oral temperature is 101.2 °F (38.4 °C) (abnormal). Her blood pressure is 129/83 and her pulse is 109. Her respiration is 19 and oxygen saturation is 98%.     Chief Complaint: COVID-19 Concerns    39 yo female with history myasthenia gravis (on chronic steroids, azathioprine, pyridostigmine), hypertension, hyperlipidemia, diabetes, and obesity who presents to urgent care clinic for evaluation.  Patient complaining of fever (T-max 100.4°), generalized body aches/chills, intermittent non bloody diarrhea, and dry cough since yesterday evening.  She works at a  and has not been in work since symptoms started.  No other associated symptoms.  Taking Tylenol for symptoms.    Patient denies any paresthesias, nuchal rigidity, vision changes, hearing loss, focal weakness or deficits, or seizure activity.  Patient denies any recent URI symptoms, earache/drainage, headaches, sinus/nasal congestion or rhinorrhea, loss of taste/smell, weakness, sweats, palpitations, difficulty swallowing, sore throat, swollen glands, chest pain, shortness of breath, wheezing, leg swelling, dizziness, lightheadedness with position changes, dysuria, hematuria, rectal bleeding, bladder/bowel incontinence, flank pain, abdominal pain,or nausea/vomiting..             Constitution: Positive for chills and fever. Negative for activity change, appetite change, sweating, fatigue and generalized weakness.   HENT: Negative for ear pain, hearing loss, facial swelling, congestion, postnasal drip, sinus pain, sinus pressure, sore throat, trouble swallowing and voice change.    Neck: Negative for neck pain, neck stiffness and painful lymph nodes.   Cardiovascular: Negative for chest pain, leg swelling, palpitations, sob on exertion and passing out.   Eyes: Negative for eye discharge, eye pain, photophobia, " vision loss, double vision and blurred vision.   Respiratory: Positive for cough. Negative for chest tightness, sputum production, bloody sputum, COPD, shortness of breath, stridor, wheezing and asthma.    Gastrointestinal: Positive for diarrhea. Negative for abdominal pain, nausea, vomiting, constipation, bright red blood in stool, rectal bleeding, heartburn and bowel incontinence.   Genitourinary: Negative for dysuria, frequency, urgency, urine decreased, flank pain, bladder incontinence, hematuria and history of kidney stones.   Musculoskeletal: Positive for muscle ache. Negative for trauma, joint pain, joint swelling, abnormal ROM of joint and muscle cramps.   Skin: Negative for color change, pale, rash, wound and bruising.   Allergic/Immunologic: Negative for seasonal allergies, asthma and immunocompromised state.   Neurological: Negative for dizziness, history of vertigo, light-headedness, passing out, facial drooping, speech difficulty, coordination disturbances, loss of balance, headaches, disorientation, altered mental status, loss of consciousness, numbness, tingling and seizures.   Hematologic/Lymphatic: Negative for swollen lymph nodes, easy bruising/bleeding and trouble clotting. Does not bruise/bleed easily.   Psychiatric/Behavioral: Negative for altered mental status, disorientation, nervous/anxious, sleep disturbance and depression. The patient is not nervous/anxious.        Objective:      Physical Exam   Constitutional: She is oriented to person, place, and time. She appears well-developed. She is cooperative.  Non-toxic appearance. She does not appear ill. No distress.   HENT:   Head: Normocephalic and atraumatic.   Ears:   Right Ear: Hearing, external ear and ear canal normal. No drainage, swelling or tenderness.   Left Ear: Hearing, external ear and ear canal normal. No drainage, swelling or tenderness.   Nose: Nose normal. No rhinorrhea, purulent discharge or congestion. Right sinus exhibits no  maxillary sinus tenderness and no frontal sinus tenderness. Left sinus exhibits no maxillary sinus tenderness and no frontal sinus tenderness.   Mouth/Throat: Uvula is midline, oropharynx is clear and moist and mucous membranes are normal. Mucous membranes are moist. No oral lesions. No trismus in the jaw. No uvula swelling. No oropharyngeal exudate, posterior oropharyngeal edema or posterior oropharyngeal erythema. Tonsils are 0 on the right. Tonsils are 0 on the left. No tonsillar exudate.   Eyes: Pupils are equal, round, and reactive to light. Conjunctivae, EOM and lids are normal. Right eye exhibits no discharge. Left eye exhibits no discharge. No visual field deficit is present. Right conjunctiva is not injected. Right conjunctiva has no hemorrhage. Left conjunctiva is not injected. Left conjunctiva has no hemorrhage. extraocular movement intactvision grossly intactgaze aligned appropriately  Neck: Normal range of motion and full passive range of motion without pain. Neck supple. No neck rigidity.   Cardiovascular: Normal rate, regular rhythm, normal heart sounds and normal pulses.   Pulmonary/Chest: Effort normal and breath sounds normal. No accessory muscle usage or stridor. No respiratory distress. She has no wheezes. She exhibits no tenderness.   Abdominal: Soft. Normal appearance. She exhibits no distension and no mass. There is no splenomegaly or hepatomegaly. There is no abdominal tenderness. There is no rebound, no guarding, no tenderness at McBurney's point, negative Thibodeaux's sign, no left CVA tenderness, negative Rovsing's sign and no right CVA tenderness.   Musculoskeletal: Normal range of motion.      Right lower leg: No edema.      Left lower leg: No edema.      Comments: 5/5 strength and ROM in all extremities.  Gait normal.   Lymphadenopathy:     She has no cervical adenopathy.   Neurological: She is alert and oriented to person, place, and time. She has normal motor skills, normal sensation and  intact cranial nerves. She displays no weakness, facial symmetry and normal reflexes. No cranial nerve deficit or sensory deficit. She exhibits normal muscle tone. She has a normal Finger-Nose-Finger Test. She shows no pronator drift. She displays no seizure activity. Gait and coordination normal. Coordination normal. GCS eye subscore is 4. GCS verbal subscore is 5. GCS motor subscore is 6.   Skin: Skin is warm, dry, not diaphoretic and no rash. Capillary refill takes less than 2 seconds. Psychiatric: Her speech is normal and behavior is normal. Mood and thought content normal.   Nursing note and vitals reviewed.    Results for orders placed or performed in visit on 08/25/20   POCT COVID-19 Rapid Screening   Result Value Ref Range    POC Rapid COVID Positive (A) Negative     Acceptable Yes              Assessment:       1. COVID-19 virus infection    2. Viral syndrome        Nontoxic appearing. Vitals are stable. Patient presents for COVID nasal swab testing; rapid test positive. Patient is concerned for possible exposure.  All diagnostic testing personally reviewed and interpreted.       Patient was recommended OTC treatments for their symptoms. Patient was recommended quarantine for 20 days given her immunocompromised history. Patient was counseled, explained with the test results meaning, and answered all of questions.   Printed and verbal COVID guidelines were given. Patient understands that they received an Urgent Care treatment only and that they may be released before all your medical problems are known or treated. Strict ED versus clinic precautions given.  Patient verbalized understanding and agreed with plan of care.    Note dictated with voice recognition software, please excuse any grammatical errors.    Plan:         COVID-19 virus infection  -     POCT COVID-19 Rapid Screening    Viral syndrome  -     POCT COVID-19 Rapid Screening           Patient Instructions     PLEASE READ YOUR  DISCHARGE INSTRUCTIONS ENTIRELY AS IT CONTAINS IMPORTANT INFORMATION.    Patient had covid testing done today.  Patient understand that they cannot return to work until the below recommendations.     If Positive: improved symptoms, no fever without fever reducing meds for 24 hours- have to be out for a minimum of 20 days passed from when symptoms first appeared with improvements in respiratory symptoms.    Discussed corona virus precautions and reviewed CDC FAC; printed a copy for patient.  I discussed to continue to monitor their symptoms. Discussed that if their symptoms persist or worsen to seek re-evaluation. Clinic vs. ER precautions were given.  Patient verbalized understanding and agreed with the above plan of care.    - Rest.  Drink plenty of fluids.    - Tylenol as directed as needed for fever/pain.  For Tylenol, do not exceed 4000 mg/ day.     -Below are suggestions for symptomatic relief:              -Salt water gargles to soothe throat pain.              -Chloroseptic spray also helps to numb throat pain.              -Nasal saline spray reduces inflammation and dryness.              -Warm face compresses to help with facial sinus pain/pressure.              -Vicks vapor rub at night.              -Flonase OTC or Nasacort OTC for nasal congestion.              -Simple foods like chicken noodle soup.              -Mucinex for cough during the day time. Delsym helps with coughing at night. Mucinex-D if you have chest congestion or sputum (caution if history of high blood pressure or palpitations).              -Zyrtec/Claritin during the day & Benadryl at night may help with allergies.  -If you DO NOT have Hypertension or any history of palpitations, it is ok to take over the counter Sudafed or Mucinex D or Allegra-D or Claritin-D or Zyrtec-D.  -If you do take one of the above, it is ok to combine that with plain over the counter Mucinex or Allegra or Claritin or Zyrtec. If, for example, you are taking  Zyrtec -D, you can combine that with Mucinex, but not Mucinex-D.  If you are taking Mucinex-D, you can combine that with plain Allegra or Claritin or Zyrtec.   -If you DO have Hypertension or palpitations, it is safe to take Coricidin HBP for relief of sinus symptoms.      For your GI symptoms:  -Use gatorade/pedialyte or rehydration packets to help stay hydrated. Vitamin water and plain water do not contain rehydrating electrolytes.  -Increase clear liquids (water, gatorade, pedialyte, broths, jello, etc) Hold off on solids for 12-18 hours. Then advance to BRAT diet (banana, rice, applesauce, tea, toast/crackers), then advance further as tolerated. Avoid spicy or fatty foods.   -Use Peptobismol or Immodium to help alleviate your diarrhea symptoms.   -Avoid imodium unless you have more than 6 loose stools in 24 hours.   -Wash hands frequently while sick. Avoid ibuprofen or other NSAIDS until you are well.   -Please go to the ER if you experience worsening pain, blood in your vomit or stool, high fever, dizziness, fainting, swelling of your abdomen, inability to pass gas or stool.         -You must understand that you've received an Urgent Care treatment only and that you may be released before all your medical problems are known or treated. You, the patient, will arrange for follow up care as instructed. Please arrange follow up with your primary medical clinic within 2-5 days if your signs and symptoms have not resolved or worsen.   - Follow up with your PCP or specialty clinic as directed.  You can call [phone] or [phone] to schedule an appointment with the appropriate provider.    - If your condition worsens or fails to improve we recommend that you receive another evaluation at the emergency room immediately or contact your primary medical clinic to discuss your concerns.            Instructions for Patients Awaiting COVID-19 Test Results    You will either be called with your test result or it will  be released to the patient portal.  If you have any questions about your test, please visit [email or web address] or call our COVID-19 information line at [phone].    Prevention steps for patients with confirmed or suspected COVID-19       Stay home and stay away from family members and friends. The CDC says, you can leave home after these three things have happened: 1) You have had no fever for at least 24 hours (that is one full day of no fever without the use of medicine that reduces fevers) 2) AND other symptoms have improved (for example, when your cough or shortness of breath have improved) 3) AND at least 10 days have passed since your symptoms first appeared.   Separate yourself from other people and animals in your home.   Call ahead before visiting your doctor.   Wear a facemask.   Cover your coughs and sneezes.   Wash your hands often with soap and water; hand  can be used, too.   Avoid sharing personal household items.   Wipe down surfaces used daily.   Monitor your symptoms. Seek prompt medical attention if your illness is worsening (e.g., difficulty breathing).    Before seeking care, call your healthcare provider.   If you have a medical emergency and need to call 911, notify the dispatch personnel that you have, or are being evaluated for COVID-19. If possible, put on a facemask before emergency medical services arrive.        Recommended precautions for household members, intimate partners, and caregivers in a home setting of a patient with symptomatic laboratory-confirmed COVID-19 or a patient under investigation.  Household members, intimate partners, and caregivers in the home setting awaiting tests results have close contact with a person with symptomatic, laboratory-confirmed COVID-19 or a person under investigation. Close contacts should monitor their health; they should call their provider right away if they develop symptoms suggestive of COVID-19 (e.g.,  fever, cough, shortness of breath).    Close contacts should also follow these recommendations:   Make sure that you understand and can help the patient follow their provider's instructions for medication(s) and care. You should help the patient with basic needs in the home and provide support for getting groceries, prescriptions, and other personal needs.   Monitor the patient's symptoms. If the patient is getting sicker, call his or her healthcare provider and tell them that the patient has laboratory-confirmed COVID-19. If the patient has a medical emergency and you need to call 911, notify the dispatch personnel that the patient has, or is being evaluated for COVID-19.   Household members should stay in another room or be  from the patient. Household members should use a separate bedroom and bathroom, if available.   Prohibit visitors.   Household members should care for any pets in the home.   Make sure that shared spaces in the home have good air flow, such as by an air conditioner or an opened window, weather permitting.   Perform hand hygiene frequently. Wash your hands often with soap and water for at least 20 seconds or use an alcohol-based hand  (that contains > 60% alcohol) covering all surfaces of your hands and rubbing them together until they feel dry. Soap and water should be used preferentially.   Avoid touching your eyes, nose, and mouth.   The patient should wear a facemask. If the patient is not able to wear a facemask (for example, because it causes trouble breathing), caregivers should wear a mask when they are in the same room as the patient.   Wear a disposable facemask and gloves when you touch or have contact with the patient's blood, stool, or body fluids, such as saliva, sputum, nasal mucus, vomit, urine.  o Throw out disposable facemasks and gloves after using them. Do not reuse.  o When removing personal protective equipment, first remove and dispose of  gloves. Then, immediately clean your hands with soap and water or alcohol-based hand . Next, remove and dispose of facemask, and immediately clean your hands again with soap and water or alcohol-based hand .   You should not share dishes, drinking glasses, cups, eating utensils, towels, bedding, or other items with the patient. After the patient uses these items, you should wash them thoroughly (see below Wash laundry thoroughly).   Clean all high-touch surfaces, such as counters, tabletops, doorknobs, bathroom fixtures, toilets, phones, keyboards, tablets, and bedside tables, every day. Also, clean any surfaces that may have blood, stool, or body fluids on them.   Use a household cleaning spray or wipe, according to the label instructions. Labels contain instructions for safe and effective use of the cleaning product including precautions you should take when applying the product, such as wearing gloves and making sure you have good ventilation during use of the product.   Wash laundry thoroughly.  o Immediately remove and wash clothes or bedding that have blood, stool, or body fluids on them.  o Wear disposable gloves while handling soiled items and keep soiled items away from your body. Clean your hands (with soap and water or an alcohol-based hand ) immediately after removing your gloves.  o Read and follow directions on labels of laundry or clothing items and detergent. In general, using a normal laundry detergent according to washing machine instructions and dry thoroughly using the warmest temperatures recommended on the clothing label.   Place all used disposable gloves, facemasks, and other contaminated items in a lined container before disposing of them with other household waste. Clean your hands (with soap and water or an alcohol-based hand ) immediately after handling these items. Soap and water should be used preferentially if hands are visibly  dirty.   Discuss any additional questions with your state or local health department or healthcare provider. Check available hours when contacting your local health department.    For more information see CDC link below.      https://www.cdc.gov/coronavirus/2019-ncov/hcp/guidance-prevent-spread.html#precautions        Sources:  [facility], [place] [facility] and [facility]          Instructions for Home Care of Patients and Caretakers with Coronavirus Disease 2019     Limit visitors to the home.  Older persons and those that have chronic medical conditions such as diabetes, lung and heart disease are at increased risk for illness.    If possible, patients should use a separate bedroom while recovering. Caregivers and household members should avoid prolonged contact with the patient which means to stay 6 feet away and avoid contact with cough droplets.  When close contact is necessary, wash your hands before and immediately after contact.    Perform hand hygiene frequently. Wash your hands often with soap and water for at least 20 seconds or use an alcohol-based hand , covering all surfaces of your hands and rubbing them together until they feel dry.    Avoid touching your eyes, nose, and mouth with unwashed hands.   Avoid sharing household items with the patient. You should not share dishes, drinking glasses, cups, eating utensils, towels, bedding, or other items. After the patient uses these items, you should wash them thoroughly.   Wash laundry thoroughly.   o Immediately remove and wash clothes or bedding that have blood, stool, or body fluids on them.   Clean all high-touch surfaces, such as counters, tabletops, doorknobs, bathroom fixtures, toilets, phones, keyboards, tablets, and bedside tables, every day.   o Use a household cleaning spray or wipe, according to the label instructions. Labels contain instructions for safe and effective use of the cleaning product including precautions  you should take when applying the product, such as wearing gloves and making sure you have good ventilation during use of the product.    For more information see CDC link below.      https://www.cdc.gov/coronavirus/2019-ncov/hcp/guidance-prevent-spread.html#precautions               If your symptoms worsen or if you have any other concerns, please contact [facility] at [phone].

## 2020-08-25 NOTE — LETTER
[address] [place] [address] ? [place], [address] ? Phone [phone] ? Fax [phone]           Return to Work/School    Patient: Vicky Joy  YOB: 1980   Date: 08/25/2020      To Whom It May Concern:     Vicky Joy was in contact with/seen in my office on 08/25/2020. COVID-19 is present in our communities across the state. Not all patients are eligible or appropriate to be tested. In this situation, your employee meets the following criteria:     Vicky Joy has met the criteria for COVID-19 testing and has a POSITIVE result. She can return to work once they are asymptomatic for 24 hours without the use of fever reducing medications AND at least ten days from the start of symptoms (or from the first positive result if they have no symptoms) AND 20 DAYS HAVE PASSED SINCE TODAY'S DATE (8/25/2020).      If you have any questions or concerns, or if I can be of further assistance, please do not hesitate to contact me.     Sincerely,    El Panchal PA-C

## 2020-08-25 NOTE — PATIENT INSTRUCTIONS
PLEASE READ YOUR DISCHARGE INSTRUCTIONS ENTIRELY AS IT CONTAINS IMPORTANT INFORMATION.    Patient had covid testing done today.  Patient understand that they cannot return to work until the below recommendations.     If Positive: improved symptoms, no fever without fever reducing meds for 24 hours- have to be out for a minimum of 20 days passed from when symptoms first appeared with improvements in respiratory symptoms.    Discussed corona virus precautions and reviewed CDC FAC; printed a copy for patient.  I discussed to continue to monitor their symptoms. Discussed that if their symptoms persist or worsen to seek re-evaluation. Clinic vs. ER precautions were given.  Patient verbalized understanding and agreed with the above plan of care.    - Rest.  Drink plenty of fluids.    - Tylenol as directed as needed for fever/pain.  For Tylenol, do not exceed 4000 mg/ day.     -Below are suggestions for symptomatic relief:              -Salt water gargles to soothe throat pain.              -Chloroseptic spray also helps to numb throat pain.              -Nasal saline spray reduces inflammation and dryness.              -Warm face compresses to help with facial sinus pain/pressure.              -Vicks vapor rub at night.              -Flonase OTC or Nasacort OTC for nasal congestion.              -Simple foods like chicken noodle soup.              -Mucinex for cough during the day time. Delsym helps with coughing at night. Mucinex-D if you have chest congestion or sputum (caution if history of high blood pressure or palpitations).              -Zyrtec/Claritin during the day & Benadryl at night may help with allergies.  -If you DO NOT have Hypertension or any history of palpitations, it is ok to take over the counter Sudafed or Mucinex D or Allegra-D or Claritin-D or Zyrtec-D.  -If you do take one of the above, it is ok to combine that with plain over the counter Mucinex or Allegra or Claritin or Zyrtec. If, for  example, you are taking Zyrtec -D, you can combine that with Mucinex, but not Mucinex-D.  If you are taking Mucinex-D, you can combine that with plain Allegra or Claritin or Zyrtec.   -If you DO have Hypertension or palpitations, it is safe to take Coricidin HBP for relief of sinus symptoms.      For your GI symptoms:  -Use gatorade/pedialyte or rehydration packets to help stay hydrated. Vitamin water and plain water do not contain rehydrating electrolytes.  -Increase clear liquids (water, gatorade, pedialyte, broths, jello, etc) Hold off on solids for 12-18 hours. Then advance to BRAT diet (banana, rice, applesauce, tea, toast/crackers), then advance further as tolerated. Avoid spicy or fatty foods.   -Use Peptobismol or Immodium to help alleviate your diarrhea symptoms.   -Avoid imodium unless you have more than 6 loose stools in 24 hours.   -Wash hands frequently while sick. Avoid ibuprofen or other NSAIDS until you are well.   -Please go to the ER if you experience worsening pain, blood in your vomit or stool, high fever, dizziness, fainting, swelling of your abdomen, inability to pass gas or stool.         -You must understand that you've received an Urgent Care treatment only and that you may be released before all your medical problems are known or treated. You, the patient, will arrange for follow up care as instructed. Please arrange follow up with your primary medical clinic within 2-5 days if your signs and symptoms have not resolved or worsen.   - Follow up with your PCP or specialty clinic as directed.  You can call [phone] or [phone] to schedule an appointment with the appropriate provider.    - If your condition worsens or fails to improve we recommend that you receive another evaluation at the emergency room immediately or contact your primary medical clinic to discuss your concerns.            Instructions for Patients Awaiting COVID-19 Test Results    You will either be called with your  test result or it will be released to the patient portal.  If you have any questions about your test, please visit [email or web address] or call our COVID-19 information line at [phone].    Prevention steps for patients with confirmed or suspected COVID-19       Stay home and stay away from family members and friends. The CDC says, you can leave home after these three things have happened: 1) You have had no fever for at least 24 hours (that is one full day of no fever without the use of medicine that reduces fevers) 2) AND other symptoms have improved (for example, when your cough or shortness of breath have improved) 3) AND at least 10 days have passed since your symptoms first appeared.   Separate yourself from other people and animals in your home.   Call ahead before visiting your doctor.   Wear a facemask.   Cover your coughs and sneezes.   Wash your hands often with soap and water; hand  can be used, too.   Avoid sharing personal household items.   Wipe down surfaces used daily.   Monitor your symptoms. Seek prompt medical attention if your illness is worsening (e.g., difficulty breathing).    Before seeking care, call your healthcare provider.   If you have a medical emergency and need to call 911, notify the dispatch personnel that you have, or are being evaluated for COVID-19. If possible, put on a facemask before emergency medical services arrive.        Recommended precautions for household members, intimate partners, and caregivers in a home setting of a patient with symptomatic laboratory-confirmed COVID-19 or a patient under investigation.  Household members, intimate partners, and caregivers in the home setting awaiting tests results have close contact with a person with symptomatic, laboratory-confirmed COVID-19 or a person under investigation. Close contacts should monitor their health; they should call their provider right away if they develop symptoms suggestive  of COVID-19 (e.g., fever, cough, shortness of breath).    Close contacts should also follow these recommendations:   Make sure that you understand and can help the patient follow their provider's instructions for medication(s) and care. You should help the patient with basic needs in the home and provide support for getting groceries, prescriptions, and other personal needs.   Monitor the patient's symptoms. If the patient is getting sicker, call his or her healthcare provider and tell them that the patient has laboratory-confirmed COVID-19. If the patient has a medical emergency and you need to call 911, notify the dispatch personnel that the patient has, or is being evaluated for COVID-19.   Household members should stay in another room or be  from the patient. Household members should use a separate bedroom and bathroom, if available.   Prohibit visitors.   Household members should care for any pets in the home.   Make sure that shared spaces in the home have good air flow, such as by an air conditioner or an opened window, weather permitting.   Perform hand hygiene frequently. Wash your hands often with soap and water for at least 20 seconds or use an alcohol-based hand  (that contains > 60% alcohol) covering all surfaces of your hands and rubbing them together until they feel dry. Soap and water should be used preferentially.   Avoid touching your eyes, nose, and mouth.   The patient should wear a facemask. If the patient is not able to wear a facemask (for example, because it causes trouble breathing), caregivers should wear a mask when they are in the same room as the patient.   Wear a disposable facemask and gloves when you touch or have contact with the patient's blood, stool, or body fluids, such as saliva, sputum, nasal mucus, vomit, urine.  o Throw out disposable facemasks and gloves after using them. Do not reuse.  o When removing personal protective equipment, first remove  and dispose of gloves. Then, immediately clean your hands with soap and water or alcohol-based hand . Next, remove and dispose of facemask, and immediately clean your hands again with soap and water or alcohol-based hand .   You should not share dishes, drinking glasses, cups, eating utensils, towels, bedding, or other items with the patient. After the patient uses these items, you should wash them thoroughly (see below Wash laundry thoroughly).   Clean all high-touch surfaces, such as counters, tabletops, doorknobs, bathroom fixtures, toilets, phones, keyboards, tablets, and bedside tables, every day. Also, clean any surfaces that may have blood, stool, or body fluids on them.   Use a household cleaning spray or wipe, according to the label instructions. Labels contain instructions for safe and effective use of the cleaning product including precautions you should take when applying the product, such as wearing gloves and making sure you have good ventilation during use of the product.   Wash laundry thoroughly.  o Immediately remove and wash clothes or bedding that have blood, stool, or body fluids on them.  o Wear disposable gloves while handling soiled items and keep soiled items away from your body. Clean your hands (with soap and water or an alcohol-based hand ) immediately after removing your gloves.  o Read and follow directions on labels of laundry or clothing items and detergent. In general, using a normal laundry detergent according to washing machine instructions and dry thoroughly using the warmest temperatures recommended on the clothing label.   Place all used disposable gloves, facemasks, and other contaminated items in a lined container before disposing of them with other household waste. Clean your hands (with soap and water or an alcohol-based hand ) immediately after handling these items. Soap and water should be used preferentially if hands are  visibly dirty.   Discuss any additional questions with your state or local health department or healthcare provider. Check available hours when contacting your local health department.    For more information see CDC link below.      https://www.cdc.gov/coronavirus/2019-ncov/hcp/guidance-prevent-spread.html#precautions        Sources:  [facility], [place] [facility]          Instructions for Home Care of Patients and Caretakers with Coronavirus Disease 2019     Limit visitors to the home.  Older persons and those that have chronic medical conditions such as diabetes, lung and heart disease are at increased risk for illness.    If possible, patients should use a separate bedroom while recovering. Caregivers and household members should avoid prolonged contact with the patient which means to stay 6 feet away and avoid contact with cough droplets.  When close contact is necessary, wash your hands before and immediately after contact.    Perform hand hygiene frequently. Wash your hands often with soap and water for at least 20 seconds or use an alcohol-based hand , covering all surfaces of your hands and rubbing them together until they feel dry.    Avoid touching your eyes, nose, and mouth with unwashed hands.   Avoid sharing household items with the patient. You should not share dishes, drinking glasses, cups, eating utensils, towels, bedding, or other items. After the patient uses these items, you should wash them thoroughly.   Wash laundry thoroughly.   o Immediately remove and wash clothes or bedding that have blood, stool, or body fluids on them.   Clean all high-touch surfaces, such as counters, tabletops, doorknobs, bathroom fixtures, toilets, phones, keyboards, tablets, and bedside tables, every day.   o Use a household cleaning spray or wipe, according to the label instructions. Labels contain instructions for safe and effective use of the cleaning product including  precautions you should take when applying the product, such as wearing gloves and making sure you have good ventilation during use of the product.    For more information see CDC link below.      https://www.cdc.gov/coronavirus/2019-ncov/hcp/guidance-prevent-spread.html#precautions               If your symptoms worsen or if you have any other concerns, please contact [facility] at [phone].

## 2020-08-26 ENCOUNTER — TELEPHONE (OUTPATIENT)
Dept: INTERNAL MEDICINE | Facility: CLINIC | Age: 40
End: 2020-08-26

## 2020-08-26 NOTE — TELEPHONE ENCOUNTER
----- Message from Merna Moreno sent at 8/26/2020 12:58 PM CDT -----  Contact: Neil [phone] [email or web address]  Needs Advice    Reason for call:---DME request compression support for back--        Communication Preference:--Amada--Jack luevano--[phone] ext#[phone]--    Additional Information:Amada call to see if the nurse received the request listed above? Please call to advise.

## 2020-08-29 DIAGNOSIS — U07.1 COVID-19 VIRUS DETECTED: ICD-10-CM

## 2020-09-02 ENCOUNTER — NURSE TRIAGE (OUTPATIENT)
Dept: ADMINISTRATIVE | Facility: CLINIC | Age: 40
End: 2020-09-02

## 2020-09-02 NOTE — TELEPHONE ENCOUNTER
RN contacted pt through the Covid Home Symptom Monitoring Program.  Pt reported that she has been having intermittent fevers as high as 102F overnight and currently 100F this AM.  Pt is also experiencing body aches.  Home care advised.  Pt instructed to increase her fluid intake, get lots of rest, remain in cool temps, and continue to take her Tylenol ATC for fever and  body aches.  Pt also encouraged to use a cool cloth on her forehead and take a bath and soak with some epsom salt to assist with her body aches.  Pt advised to call [facility] if she has any further questions/concerns or consult with her provider.  If symptoms should worsen, pt instructed to go to the nearest ED.  Pt verbalized understanding to all.    Reason for Disposition   [1] COVID-19 diagnosed by HCP (doctor, NP or PA) AND [2] mild symptoms (e.g., cough, fever, others) AND [3] no complications or SOB    Additional Information   Negative: Severe difficulty breathing (e.g., struggling for each breath, speaks in single words)   Negative: Difficult to awaken or acting confused (e.g., disoriented, slurred speech)   Negative: Bluish (or gray) lips or face now   Negative: Shock suspected (e.g., cold/pale/clammy skin, too weak to stand, low BP, rapid pulse)   Negative: Sounds like a life-threatening emergency to the triager   Negative: [1] COVID-19 exposure AND [2] no symptoms   Negative: COVID-19 and Breastfeeding, questions about   Negative: [1] Adult with possible COVID-19 symptoms AND [2] triager concerned about severity of symptoms or other causes   Negative: SEVERE or constant chest pain or pressure (Exception: mild central chest pain, present only when coughing)   Negative: MODERATE difficulty breathing (e.g., speaks in phrases, SOB even at rest, pulse 100-120)   Negative: MILD difficulty breathing (e.g., minimal/no SOB at rest, SOB with walking, pulse <100)   Negative: Chest pain   Negative: Patient sounds very sick or weak to the  triager   Negative: Fever > 103 F (39.4 C)   Negative: [1] Fever > 101 F (38.3 C) AND [2] age > 60   Negative: [1] Fever > 100.0 F (37.8 C) AND [2] bedridden (e.g., nursing home patient, CVA, chronic illness, recovering from surgery)   Negative: HIGH RISK patient (e.g., age > 64 years, diabetes, heart or lung disease, weak immune system) (Exception: has already been evaluated by healthcare provider and has no new or worsening symptoms)   Negative: [1] COVID-19 infection suspected by caller or triager AND [2] mild symptoms (cough, fever, or others) AND [3] no complications or SOB   Negative: Fever present > 3 days (72 hours)   Negative: [1] Fever returns after gone for over 24 hours AND [2] symptoms worse or not improved   Negative: [1] Continuous (nonstop) coughing interferes with work or school AND [2] no improvement using cough treatment per protocol   Negative: Cough present > 3 weeks    Protocols used: CORONAVIRUS (COVID-19) DIAGNOSED OR [id number]

## 2020-09-10 ENCOUNTER — TELEPHONE (OUTPATIENT)
Dept: INTERNAL MEDICINE | Facility: CLINIC | Age: 40
End: 2020-09-10

## 2020-09-10 NOTE — TELEPHONE ENCOUNTER
----- Message from aPo Bruno sent at 9/10/2020 11:40 AM CDT -----  Contact: Amada cornejo  Type:  Needs Medical Advice    Who Called:  Amaad cornejo    Symptoms (please be specific): chart notes    Would the patient rather a call back or a response via [facility]? FAX: [phone]    Best Call Back Number: [phone] ext [phone]    Additional Information: Amada called to request chart notes be faxed to the above fax.

## 2020-09-18 DIAGNOSIS — E11.9 TYPE 2 DIABETES MELLITUS WITHOUT COMPLICATION: ICD-10-CM

## 2020-10-05 ENCOUNTER — PATIENT MESSAGE (OUTPATIENT)
Dept: ADMINISTRATIVE | Facility: HOSPITAL | Age: 40
End: 2020-10-05

## 2020-10-30 ENCOUNTER — IMMUNIZATION (OUTPATIENT)
Dept: INTERNAL MEDICINE | Facility: CLINIC | Age: 40
End: 2020-10-30
Payer: MEDICARE

## 2020-10-30 ENCOUNTER — OFFICE VISIT (OUTPATIENT)
Dept: INTERNAL MEDICINE | Facility: CLINIC | Age: 40
End: 2020-10-30
Payer: MEDICARE

## 2020-10-30 ENCOUNTER — HOSPITAL ENCOUNTER (OUTPATIENT)
Dept: RADIOLOGY | Facility: HOSPITAL | Age: 40
Discharge: HOME | End: 2020-10-30
Attending: INTERNAL MEDICINE
Payer: MEDICARE

## 2020-10-30 VITALS
BODY MASS INDEX: 29.8 KG/M2 | DIASTOLIC BLOOD PRESSURE: 80 MMHG | HEIGHT: 66 IN | WEIGHT: 185.44 LBS | SYSTOLIC BLOOD PRESSURE: 130 MMHG | HEART RATE: 66 BPM | OXYGEN SATURATION: 98 %

## 2020-10-30 DIAGNOSIS — E78.2 MIXED HYPERLIPIDEMIA: ICD-10-CM

## 2020-10-30 DIAGNOSIS — Z86.16 HISTORY OF 2019 NOVEL CORONAVIRUS DISEASE (COVID-19): ICD-10-CM

## 2020-10-30 DIAGNOSIS — E11.59 HYPERTENSION ASSOCIATED WITH DIABETES: ICD-10-CM

## 2020-10-30 DIAGNOSIS — R06.00 DYSPNEA, UNSPECIFIED TYPE: ICD-10-CM

## 2020-10-30 DIAGNOSIS — G70.00 MG (MYASTHENIA GRAVIS): Primary | ICD-10-CM

## 2020-10-30 DIAGNOSIS — E09.9 STEROID-INDUCED DIABETES MELLITUS, SUBSEQUENT ENCOUNTER: ICD-10-CM

## 2020-10-30 DIAGNOSIS — T38.0X5D STEROID-INDUCED DIABETES MELLITUS, SUBSEQUENT ENCOUNTER: ICD-10-CM

## 2020-10-30 DIAGNOSIS — I15.2 HYPERTENSION ASSOCIATED WITH DIABETES: ICD-10-CM

## 2020-10-30 PROCEDURE — G0008 ADMIN INFLUENZA VIRUS VAC: HCPCS | Mod: S$GLB,,, | Performed by: INTERNAL MEDICINE

## 2020-10-30 PROCEDURE — 99214 OFFICE O/P EST MOD 30 MIN: CPT | Mod: 25,S$GLB,, | Performed by: INTERNAL MEDICINE

## 2020-10-30 PROCEDURE — 99214 PR OFFICE/OUTPT VISIT, EST, LEVL IV, 30-39 MIN: ICD-10-PCS | Mod: 25,S$GLB,, | Performed by: INTERNAL MEDICINE

## 2020-10-30 PROCEDURE — G0008 FLU VACCINE (QUAD) GREATER THAN OR EQUAL TO 3YO PRESERVATIVE FREE IM: ICD-10-PCS | Mod: S$GLB,,, | Performed by: INTERNAL MEDICINE

## 2020-10-30 PROCEDURE — 90686 IIV4 VACC NO PRSV 0.5 ML IM: CPT | Mod: S$GLB,,, | Performed by: INTERNAL MEDICINE

## 2020-10-30 PROCEDURE — 99999 PR PBB SHADOW E&M-EST. PATIENT-LVL IV: ICD-10-PCS | Mod: PBBFAC,,, | Performed by: INTERNAL MEDICINE

## 2020-10-30 PROCEDURE — 71046 XR CHEST PA AND LATERAL: ICD-10-PCS | Mod: 26,,, | Performed by: RADIOLOGY

## 2020-10-30 PROCEDURE — 71046 X-RAY EXAM CHEST 2 VIEWS: CPT | Mod: TC

## 2020-10-30 PROCEDURE — 2024F PR 7 FIELD PHOTOS WITH INTERP/ REVIEW: ICD-10-PCS | Mod: S$GLB,,, | Performed by: INTERNAL MEDICINE

## 2020-10-30 PROCEDURE — 71046 X-RAY EXAM CHEST 2 VIEWS: CPT | Mod: 26,,, | Performed by: RADIOLOGY

## 2020-10-30 PROCEDURE — 90686 FLU VACCINE (QUAD) GREATER THAN OR EQUAL TO 3YO PRESERVATIVE FREE IM: ICD-10-PCS | Mod: S$GLB,,, | Performed by: INTERNAL MEDICINE

## 2020-10-30 PROCEDURE — 99999 PR PBB SHADOW E&M-EST. PATIENT-LVL IV: CPT | Mod: PBBFAC,,, | Performed by: INTERNAL MEDICINE

## 2020-10-30 PROCEDURE — 2024F 7 FLD RTA PHOTO EVC RTNOPTHY: CPT | Mod: S$GLB,,, | Performed by: INTERNAL MEDICINE

## 2020-10-30 RX ORDER — AZATHIOPRINE 50 MG/1
100 TABLET ORAL 2 TIMES DAILY
Qty: 360 TABLET | Refills: 3 | Status: SHIPPED | OUTPATIENT
Start: 2020-10-30 | End: 2021-11-26

## 2020-10-30 RX ORDER — PREDNISONE 10 MG/1
10 TABLET ORAL DAILY
Qty: 90 TABLET | Refills: 3 | Status: SHIPPED | OUTPATIENT
Start: 2020-10-30 | End: 2021-12-17

## 2020-10-30 NOTE — PROGRESS NOTES
Subjective:       Patient ID: Vicky Joy is a 40 y.o. female.    Chief Complaint:   Follow-up    HPI - Vicky feels well.  She had COVID-19 in late August and has had some SULLIVAN since then.  Reviewing chart shows that this was a complaint of hers in Nov 2019 as well.  She is due for some lab work.  Would like a back brace for her chronic back pain.  Needs refills.  She is not a smoker.  She has not seen neurology since her neurologist left [facility].  Promises to make an appointment.    PMH:  Myasthenia gravis on steroids long term  Steroid induced DM  HTN  Chronic low back pain  HLP on a statin  COVID-19 infection Aug 2020     Meds:  Reviewed and reconciled in EPIC with patient during visit today.    Review of Systems   Constitutional: Negative for fever.   HENT: Negative for congestion.    Respiratory: Positive for shortness of breath.    Cardiovascular: Negative for chest pain.   Gastrointestinal: Negative for abdominal pain.   Genitourinary: Negative for difficulty urinating.   Musculoskeletal: Positive for back pain.   Skin: Negative for rash.   Neurological: Negative for headaches.   Psychiatric/Behavioral: Negative for sleep disturbance.       Objective:      Physical Exam  Vitals signs reviewed.   Constitutional:       General: She is not in acute distress.     Appearance: Normal appearance. She is well-developed. She is not ill-appearing.      Comments: Clinton hump and moon face   HENT:      Head: Normocephalic and atraumatic.   Cardiovascular:      Rate and Rhythm: Normal rate and regular rhythm.      Heart sounds: Normal heart sounds. No murmur. No friction rub. No gallop.    Pulmonary:      Effort: Pulmonary effort is normal. No respiratory distress.      Breath sounds: Normal breath sounds. No wheezing or rales.   Chest:      Chest wall: No tenderness.   Skin:     General: Skin is warm and dry.      Findings: No erythema.   Neurological:      General: No focal deficit present.      Mental  Status: She is alert and oriented to person, place, and time.   Psychiatric:         Mood and Affect: Mood normal.         Assessment:       1. MG (myasthenia gravis)    2. Hypertension associated with diabetes    3. Steroid-induced diabetes mellitus, subsequent encounter    4. Mixed hyperlipidemia    5. Dyspnea, unspecified type    6. History of 2019 novel coronavirus disease (COVID-19)        Plan:       Vicky was seen today for follow-up.    Diagnoses and all orders for this visit:    MG (myasthenia gravis) - doing well.  Provided refills.  Please f/u with neurology  -     azaTHIOprine (IMURAN) 50 mg Tab; Take 2 tablets (100 mg total) by mouth 2 (two) times daily.  -     predniSONE (DELTASONE) 10 MG tablet; Take 1 tablet (10 mg total) by mouth once daily.    Hypertension associated with diabetes - at goal, stay the course  -     Comprehensive Metabolic Panel; Future  -     Hepatitis C Antibody; Future    Steroid-induced diabetes mellitus, subsequent encounter - seems stable.  a1c today  -     Hemoglobin A1C; Future    Mixed hyperlipidemia - stable on a statin  -     Lipid panel; Future    Dyspnea, unspecified type - chronic, but could be a post-covid syndrome.  CXR  -     X-Ray Chest PA And Lateral; Future    History of 2019 novel coronavirus disease (COVID-19)  -     X-Ray Chest PA And Lateral; Future    rtc prn, or in 6 months    LEE Razo MD MPH  Staff Internist

## 2020-11-03 ENCOUNTER — LAB VISIT (OUTPATIENT)
Dept: LAB | Facility: HOSPITAL | Age: 40
End: 2020-11-03
Attending: INTERNAL MEDICINE
Payer: MEDICARE

## 2020-11-03 DIAGNOSIS — T38.0X5D STEROID-INDUCED DIABETES MELLITUS, SUBSEQUENT ENCOUNTER: ICD-10-CM

## 2020-11-03 DIAGNOSIS — E78.2 MIXED HYPERLIPIDEMIA: ICD-10-CM

## 2020-11-03 DIAGNOSIS — E09.9 STEROID-INDUCED DIABETES MELLITUS, SUBSEQUENT ENCOUNTER: ICD-10-CM

## 2020-11-03 DIAGNOSIS — I15.2 HYPERTENSION ASSOCIATED WITH DIABETES: ICD-10-CM

## 2020-11-03 DIAGNOSIS — E11.59 HYPERTENSION ASSOCIATED WITH DIABETES: ICD-10-CM

## 2020-11-03 LAB
ALBUMIN SERPL BCP-MCNC: 3.6 G/DL (ref 3.5–5.2)
ALP SERPL-CCNC: 96 U/L (ref 55–135)
ALT SERPL W/O P-5'-P-CCNC: 26 U/L (ref 10–44)
ANION GAP SERPL CALC-SCNC: 11 MMOL/L (ref 8–16)
AST SERPL-CCNC: 26 U/L (ref 10–40)
BILIRUB SERPL-MCNC: 1.1 MG/DL (ref 0.1–1)
BUN SERPL-MCNC: 8 MG/DL (ref 6–20)
CALCIUM SERPL-MCNC: 9.1 MG/DL (ref 8.7–10.5)
CHLORIDE SERPL-SCNC: 107 MMOL/L (ref 95–110)
CHOLEST SERPL-MCNC: 240 MG/DL (ref 120–199)
CHOLEST/HDLC SERPL: 5.6 {RATIO} (ref 2–5)
CO2 SERPL-SCNC: 24 MMOL/L (ref 23–29)
CREAT SERPL-MCNC: 0.8 MG/DL (ref 0.5–1.4)
EST. GFR  (AFRICAN AMERICAN): >60 ML/MIN/1.73 M^2
EST. GFR  (NON AFRICAN AMERICAN): >60 ML/MIN/1.73 M^2
ESTIMATED AVG GLUCOSE: 134 MG/DL (ref 68–131)
GLUCOSE SERPL-MCNC: 118 MG/DL (ref 70–110)
HBA1C MFR BLD HPLC: 6.3 % (ref 4–5.6)
HDLC SERPL-MCNC: 43 MG/DL (ref 40–75)
HDLC SERPL: 17.9 % (ref 20–50)
LDLC SERPL CALC-MCNC: 164.6 MG/DL (ref 63–159)
NONHDLC SERPL-MCNC: 197 MG/DL
POTASSIUM SERPL-SCNC: 3.5 MMOL/L (ref 3.5–5.1)
PROT SERPL-MCNC: 7.6 G/DL (ref 6–8.4)
SODIUM SERPL-SCNC: 142 MMOL/L (ref 136–145)
TRIGL SERPL-MCNC: 162 MG/DL (ref 30–150)

## 2020-11-03 PROCEDURE — 83036 HEMOGLOBIN GLYCOSYLATED A1C: CPT

## 2020-11-03 PROCEDURE — 36415 COLL VENOUS BLD VENIPUNCTURE: CPT

## 2020-11-03 PROCEDURE — 86803 HEPATITIS C AB TEST: CPT

## 2020-11-03 PROCEDURE — 80053 COMPREHEN METABOLIC PANEL: CPT

## 2020-11-03 PROCEDURE — 80061 LIPID PANEL: CPT

## 2020-11-04 ENCOUNTER — PATIENT MESSAGE (OUTPATIENT)
Dept: INTERNAL MEDICINE | Facility: CLINIC | Age: 40
End: 2020-11-04

## 2020-11-04 LAB — HCV AB SERPL QL IA: NEGATIVE

## 2020-12-11 ENCOUNTER — PATIENT MESSAGE (OUTPATIENT)
Dept: OTHER | Facility: OTHER | Age: 40
End: 2020-12-11

## 2021-01-13 ENCOUNTER — LAB VISIT (OUTPATIENT)
Dept: PRIMARY CARE CLINIC | Facility: OTHER | Age: 41
End: 2021-01-13
Attending: INTERNAL MEDICINE
Payer: MEDICARE

## 2021-01-13 DIAGNOSIS — Z20.822 ENCOUNTER FOR LABORATORY TESTING FOR COVID-19 VIRUS: ICD-10-CM

## 2021-01-13 PROCEDURE — U0003 INFECTIOUS AGENT DETECTION BY NUCLEIC ACID (DNA OR RNA); SEVERE ACUTE RESPIRATORY SYNDROME CORONAVIRUS 2 (SARS-COV-2) (CORONAVIRUS DISEASE [COVID-19]), AMPLIFIED PROBE TECHNIQUE, MAKING USE OF HIGH THROUGHPUT TECHNOLOGIES AS DESCRIBED BY CMS-2020-01-R: HCPCS

## 2021-01-15 LAB — SARS-COV-2 RNA RESP QL NAA+PROBE: NOT DETECTED

## 2021-01-19 ENCOUNTER — PATIENT MESSAGE (OUTPATIENT)
Dept: ADMINISTRATIVE | Facility: OTHER | Age: 41
End: 2021-01-19

## 2021-04-14 DIAGNOSIS — E11.9 TYPE 2 DIABETES MELLITUS WITHOUT COMPLICATION, UNSPECIFIED WHETHER LONG TERM INSULIN USE: ICD-10-CM

## 2021-04-29 ENCOUNTER — TELEPHONE (OUTPATIENT)
Dept: INTERNAL MEDICINE | Facility: CLINIC | Age: 41
End: 2021-04-29

## 2021-04-30 ENCOUNTER — IMMUNIZATION (OUTPATIENT)
Dept: PRIMARY CARE CLINIC | Facility: CLINIC | Age: 41
End: 2021-04-30
Payer: MEDICARE

## 2021-04-30 ENCOUNTER — OFFICE VISIT (OUTPATIENT)
Dept: INTERNAL MEDICINE | Facility: CLINIC | Age: 41
End: 2021-04-30
Payer: MEDICARE

## 2021-04-30 ENCOUNTER — LAB VISIT (OUTPATIENT)
Dept: LAB | Facility: HOSPITAL | Age: 41
End: 2021-04-30
Attending: INTERNAL MEDICINE
Payer: MEDICARE

## 2021-04-30 ENCOUNTER — CLINICAL SUPPORT (OUTPATIENT)
Dept: OPTOMETRY | Facility: CLINIC | Age: 41
End: 2021-04-30
Attending: INTERNAL MEDICINE
Payer: MEDICARE

## 2021-04-30 VITALS
WEIGHT: 180 LBS | DIASTOLIC BLOOD PRESSURE: 84 MMHG | OXYGEN SATURATION: 99 % | HEART RATE: 64 BPM | BODY MASS INDEX: 28.93 KG/M2 | HEIGHT: 66 IN | SYSTOLIC BLOOD PRESSURE: 126 MMHG

## 2021-04-30 DIAGNOSIS — G70.00 MG (MYASTHENIA GRAVIS): Primary | ICD-10-CM

## 2021-04-30 DIAGNOSIS — Z23 NEED FOR VACCINATION: Primary | ICD-10-CM

## 2021-04-30 DIAGNOSIS — I15.2 HYPERTENSION ASSOCIATED WITH DIABETES: ICD-10-CM

## 2021-04-30 DIAGNOSIS — T38.0X5D STEROID-INDUCED DIABETES MELLITUS, SUBSEQUENT ENCOUNTER: ICD-10-CM

## 2021-04-30 DIAGNOSIS — E11.59 HYPERTENSION ASSOCIATED WITH DIABETES: ICD-10-CM

## 2021-04-30 DIAGNOSIS — E78.2 MIXED HYPERLIPIDEMIA: ICD-10-CM

## 2021-04-30 DIAGNOSIS — E09.9 STEROID-INDUCED DIABETES MELLITUS, SUBSEQUENT ENCOUNTER: ICD-10-CM

## 2021-04-30 LAB
ALBUMIN SERPL BCP-MCNC: 3.6 G/DL (ref 3.5–5.2)
ALBUMIN/CREAT UR: 10.1 UG/MG (ref 0–30)
ALP SERPL-CCNC: 123 U/L (ref 55–135)
ALT SERPL W/O P-5'-P-CCNC: 23 U/L (ref 10–44)
ANION GAP SERPL CALC-SCNC: 6 MMOL/L (ref 8–16)
AST SERPL-CCNC: 21 U/L (ref 10–40)
BILIRUB SERPL-MCNC: 0.6 MG/DL (ref 0.1–1)
BUN SERPL-MCNC: 12 MG/DL (ref 6–20)
CALCIUM SERPL-MCNC: 9.2 MG/DL (ref 8.7–10.5)
CHLORIDE SERPL-SCNC: 110 MMOL/L (ref 95–110)
CHOLEST SERPL-MCNC: 191 MG/DL (ref 120–199)
CHOLEST/HDLC SERPL: 4.5 {RATIO} (ref 2–5)
CO2 SERPL-SCNC: 24 MMOL/L (ref 23–29)
CREAT SERPL-MCNC: 0.8 MG/DL (ref 0.5–1.4)
CREAT UR-MCNC: 217 MG/DL (ref 15–325)
EST. GFR  (AFRICAN AMERICAN): >60 ML/MIN/1.73 M^2
EST. GFR  (NON AFRICAN AMERICAN): >60 ML/MIN/1.73 M^2
ESTIMATED AVG GLUCOSE: 128 MG/DL (ref 68–131)
GLUCOSE SERPL-MCNC: 93 MG/DL (ref 70–110)
HBA1C MFR BLD: 6.1 % (ref 4–5.6)
HDLC SERPL-MCNC: 42 MG/DL (ref 40–75)
HDLC SERPL: 22 % (ref 20–50)
LDLC SERPL CALC-MCNC: 131 MG/DL (ref 63–159)
MICROALBUMIN UR DL<=1MG/L-MCNC: 22 UG/ML
NONHDLC SERPL-MCNC: 149 MG/DL
POTASSIUM SERPL-SCNC: 3.9 MMOL/L (ref 3.5–5.1)
PROT SERPL-MCNC: 7.5 G/DL (ref 6–8.4)
SODIUM SERPL-SCNC: 140 MMOL/L (ref 136–145)
TRIGL SERPL-MCNC: 90 MG/DL (ref 30–150)

## 2021-04-30 PROCEDURE — 83036 HEMOGLOBIN GLYCOSYLATED A1C: CPT | Performed by: INTERNAL MEDICINE

## 2021-04-30 PROCEDURE — 82570 ASSAY OF URINE CREATININE: CPT | Performed by: INTERNAL MEDICINE

## 2021-04-30 PROCEDURE — 91300 PR SARS-COV- 2 COVID-19 VACCINE, NO PRSV, 30MCG/0.3ML, IM: CPT | Mod: S$GLB,,, | Performed by: INTERNAL MEDICINE

## 2021-04-30 PROCEDURE — 92228 DIABETIC EYE SCREENING PHOTO: ICD-10-PCS | Mod: TC,S$GLB,, | Performed by: INTERNAL MEDICINE

## 2021-04-30 PROCEDURE — 99999 PR PBB SHADOW E&M-EST. PATIENT-LVL IV: CPT | Mod: PBBFAC,,, | Performed by: INTERNAL MEDICINE

## 2021-04-30 PROCEDURE — 99214 OFFICE O/P EST MOD 30 MIN: CPT | Mod: S$GLB,,, | Performed by: INTERNAL MEDICINE

## 2021-04-30 PROCEDURE — 92228 IMG RTA DETC/MNTR DS PHY/QHP: CPT | Mod: 26,S$GLB,, | Performed by: OPHTHALMOLOGY

## 2021-04-30 PROCEDURE — 99999 PR PBB SHADOW E&M-EST. PATIENT-LVL IV: ICD-10-PCS | Mod: PBBFAC,,, | Performed by: INTERNAL MEDICINE

## 2021-04-30 PROCEDURE — 0001A PR IMMUNIZ ADMIN, SARS-COV-2 COVID-19 VACC, 30MCG/0.3ML, 1ST DOSE: ICD-10-PCS | Mod: CV19,S$GLB,, | Performed by: INTERNAL MEDICINE

## 2021-04-30 PROCEDURE — 99214 PR OFFICE/OUTPT VISIT, EST, LEVL IV, 30-39 MIN: ICD-10-PCS | Mod: S$GLB,,, | Performed by: INTERNAL MEDICINE

## 2021-04-30 PROCEDURE — 80061 LIPID PANEL: CPT | Performed by: INTERNAL MEDICINE

## 2021-04-30 PROCEDURE — 82043 UR ALBUMIN QUANTITATIVE: CPT | Performed by: INTERNAL MEDICINE

## 2021-04-30 PROCEDURE — 36415 COLL VENOUS BLD VENIPUNCTURE: CPT | Performed by: INTERNAL MEDICINE

## 2021-04-30 PROCEDURE — 0001A PR IMMUNIZ ADMIN, SARS-COV-2 COVID-19 VACC, 30MCG/0.3ML, 1ST DOSE: CPT | Mod: CV19,S$GLB,, | Performed by: INTERNAL MEDICINE

## 2021-04-30 PROCEDURE — 92228 DIABETIC EYE SCREENING PHOTO: ICD-10-PCS | Mod: 26,S$GLB,, | Performed by: OPHTHALMOLOGY

## 2021-04-30 PROCEDURE — 80053 COMPREHEN METABOLIC PANEL: CPT | Performed by: INTERNAL MEDICINE

## 2021-04-30 PROCEDURE — 92228 IMG RTA DETC/MNTR DS PHY/QHP: CPT | Mod: TC,S$GLB,, | Performed by: INTERNAL MEDICINE

## 2021-04-30 PROCEDURE — 91300 PR SARS-COV- 2 COVID-19 VACCINE, NO PRSV, 30MCG/0.3ML, IM: ICD-10-PCS | Mod: S$GLB,,, | Performed by: INTERNAL MEDICINE

## 2021-04-30 RX ADMIN — Medication 0.3 ML: at 11:04

## 2021-05-03 ENCOUNTER — TELEPHONE (OUTPATIENT)
Dept: INTERNAL MEDICINE | Facility: CLINIC | Age: 41
End: 2021-05-03

## 2021-05-21 ENCOUNTER — IMMUNIZATION (OUTPATIENT)
Dept: PRIMARY CARE CLINIC | Facility: CLINIC | Age: 41
End: 2021-05-21
Payer: MEDICARE

## 2021-05-21 DIAGNOSIS — Z23 NEED FOR VACCINATION: Primary | ICD-10-CM

## 2021-05-21 PROCEDURE — 91300 COVID-19, MRNA, LNP-S, PF, 30 MCG/0.3 ML DOSE VACCINE: CPT | Mod: S$GLB,,, | Performed by: INTERNAL MEDICINE

## 2021-05-21 PROCEDURE — 0002A COVID-19, MRNA, LNP-S, PF, 30 MCG/0.3 ML DOSE VACCINE: ICD-10-PCS | Mod: CV19,S$GLB,, | Performed by: INTERNAL MEDICINE

## 2021-05-21 PROCEDURE — 91300 COVID-19, MRNA, LNP-S, PF, 30 MCG/0.3 ML DOSE VACCINE: ICD-10-PCS | Mod: S$GLB,,, | Performed by: INTERNAL MEDICINE

## 2021-05-21 PROCEDURE — 0002A COVID-19, MRNA, LNP-S, PF, 30 MCG/0.3 ML DOSE VACCINE: CPT | Mod: CV19,S$GLB,, | Performed by: INTERNAL MEDICINE

## 2021-05-28 ENCOUNTER — TELEPHONE (OUTPATIENT)
Dept: OPHTHALMOLOGY | Facility: CLINIC | Age: 41
End: 2021-05-28

## 2021-08-09 ENCOUNTER — CLINICAL SUPPORT (OUTPATIENT)
Dept: URGENT CARE | Facility: CLINIC | Age: 41
End: 2021-08-09
Payer: MEDICARE

## 2021-08-09 DIAGNOSIS — Z11.52 ENCOUNTER FOR SCREENING FOR COVID-19: Primary | ICD-10-CM

## 2021-08-09 LAB
CTP QC/QA: YES
SARS-COV-2 RDRP RESP QL NAA+PROBE: NEGATIVE

## 2021-08-09 PROCEDURE — U0002 COVID-19 LAB TEST NON-CDC: HCPCS | Mod: QW,S$GLB,, | Performed by: NURSE PRACTITIONER

## 2021-08-09 PROCEDURE — U0002: ICD-10-PCS | Mod: QW,S$GLB,, | Performed by: NURSE PRACTITIONER

## 2021-10-14 ENCOUNTER — CLINICAL SUPPORT (OUTPATIENT)
Dept: URGENT CARE | Facility: CLINIC | Age: 41
End: 2021-10-14
Payer: MEDICARE

## 2021-10-14 DIAGNOSIS — Z11.59 SCREENING FOR VIRAL DISEASE: Primary | ICD-10-CM

## 2021-10-14 LAB
CTP QC/QA: YES
SARS-COV-2 RDRP RESP QL NAA+PROBE: NEGATIVE

## 2021-10-14 PROCEDURE — U0002 COVID-19 LAB TEST NON-CDC: HCPCS | Mod: QW,S$GLB,, | Performed by: NURSE PRACTITIONER

## 2021-10-14 PROCEDURE — 99211 OFF/OP EST MAY X REQ PHY/QHP: CPT | Mod: S$GLB,,, | Performed by: NURSE PRACTITIONER

## 2021-10-14 PROCEDURE — 99211 PR OFFICE/OUTPT VISIT, EST, LEVL I: ICD-10-PCS | Mod: S$GLB,,, | Performed by: NURSE PRACTITIONER

## 2021-10-14 PROCEDURE — U0002: ICD-10-PCS | Mod: QW,S$GLB,, | Performed by: NURSE PRACTITIONER

## 2021-11-26 DIAGNOSIS — G70.00 MG (MYASTHENIA GRAVIS): ICD-10-CM

## 2021-11-26 RX ORDER — AZATHIOPRINE 50 MG/1
TABLET ORAL
Qty: 360 TABLET | Refills: 0 | Status: SHIPPED | OUTPATIENT
Start: 2021-11-26 | End: 2021-11-30 | Stop reason: SDUPTHER

## 2021-12-04 PROCEDURE — 81025 URINE PREGNANCY TEST: CPT | Performed by: EMERGENCY MEDICINE

## 2021-12-04 PROCEDURE — 99284 EMERGENCY DEPT VISIT MOD MDM: CPT | Mod: 25

## 2021-12-04 PROCEDURE — 36000 PLACE NEEDLE IN VEIN: CPT

## 2021-12-04 PROCEDURE — 81003 URINALYSIS AUTO W/O SCOPE: CPT | Performed by: EMERGENCY MEDICINE

## 2021-12-05 ENCOUNTER — HOSPITAL ENCOUNTER (EMERGENCY)
Facility: OTHER | Age: 41
Discharge: HOME | End: 2021-12-05
Attending: EMERGENCY MEDICINE
Payer: MEDICARE

## 2021-12-05 VITALS
HEART RATE: 16 BPM | HEIGHT: 66 IN | OXYGEN SATURATION: 98 % | RESPIRATION RATE: 16 BRPM | SYSTOLIC BLOOD PRESSURE: 134 MMHG | DIASTOLIC BLOOD PRESSURE: 82 MMHG | BODY MASS INDEX: 28.28 KG/M2 | TEMPERATURE: 98 F | WEIGHT: 176 LBS

## 2021-12-05 DIAGNOSIS — R10.11 RUQ ABDOMINAL PAIN: Primary | ICD-10-CM

## 2021-12-05 LAB
ALBUMIN SERPL BCP-MCNC: 3.9 G/DL (ref 3.5–5.2)
ALP SERPL-CCNC: 133 U/L (ref 55–135)
ALT SERPL W/O P-5'-P-CCNC: 22 U/L (ref 10–44)
ANION GAP SERPL CALC-SCNC: 9 MMOL/L (ref 8–16)
AST SERPL-CCNC: 20 U/L (ref 10–40)
B-HCG UR QL: NEGATIVE
BASOPHILS # BLD AUTO: 0.06 K/UL (ref 0–0.2)
BASOPHILS NFR BLD: 0.7 % (ref 0–1.9)
BILIRUB SERPL-MCNC: 0.3 MG/DL (ref 0.1–1)
BILIRUB UR QL STRIP: NEGATIVE
BUN SERPL-MCNC: 11 MG/DL (ref 6–20)
CALCIUM SERPL-MCNC: 9.3 MG/DL (ref 8.7–10.5)
CHLORIDE SERPL-SCNC: 106 MMOL/L (ref 95–110)
CLARITY UR: CLEAR
CO2 SERPL-SCNC: 25 MMOL/L (ref 23–29)
COLOR UR: YELLOW
CREAT SERPL-MCNC: 0.7 MG/DL (ref 0.5–1.4)
CREAT SERPL-MCNC: 0.7 MG/DL (ref 0.5–1.4)
CTP QC/QA: YES
DIFFERENTIAL METHOD: ABNORMAL
EOSINOPHIL # BLD AUTO: 0.1 K/UL (ref 0–0.5)
EOSINOPHIL NFR BLD: 1.6 % (ref 0–8)
ERYTHROCYTE [DISTWIDTH] IN BLOOD BY AUTOMATED COUNT: 14.6 % (ref 11.5–14.5)
EST. GFR  (AFRICAN AMERICAN): >60 ML/MIN/1.73 M^2
EST. GFR  (NON AFRICAN AMERICAN): >60 ML/MIN/1.73 M^2
GLUCOSE SERPL-MCNC: 93 MG/DL (ref 70–110)
GLUCOSE UR QL STRIP: NEGATIVE
HCT VFR BLD AUTO: 37.6 % (ref 37–48.5)
HCV AB SERPL QL IA: NEGATIVE
HGB BLD-MCNC: 12.2 G/DL (ref 12–16)
HGB UR QL STRIP: NEGATIVE
HIV 1+2 AB+HIV1 P24 AG SERPL QL IA: NEGATIVE
IMM GRANULOCYTES # BLD AUTO: 0.02 K/UL (ref 0–0.04)
IMM GRANULOCYTES NFR BLD AUTO: 0.2 % (ref 0–0.5)
KETONES UR QL STRIP: NEGATIVE
LEUKOCYTE ESTERASE UR QL STRIP: NEGATIVE
LIPASE SERPL-CCNC: 18 U/L (ref 4–60)
LYMPHOCYTES # BLD AUTO: 2.3 K/UL (ref 1–4.8)
LYMPHOCYTES NFR BLD: 25.7 % (ref 18–48)
MCH RBC QN AUTO: 31.1 PG (ref 27–31)
MCHC RBC AUTO-ENTMCNC: 32.4 G/DL (ref 32–36)
MCV RBC AUTO: 96 FL (ref 82–98)
MONOCYTES # BLD AUTO: 0.7 K/UL (ref 0.3–1)
MONOCYTES NFR BLD: 8 % (ref 4–15)
NEUTROPHILS # BLD AUTO: 5.7 K/UL (ref 1.8–7.7)
NEUTROPHILS NFR BLD: 63.8 % (ref 38–73)
NITRITE UR QL STRIP: NEGATIVE
NRBC BLD-RTO: 0 /100 WBC
PH UR STRIP: 8 [PH] (ref 5–8)
PLATELET # BLD AUTO: 270 K/UL (ref 150–450)
PMV BLD AUTO: 12 FL (ref 9.2–12.9)
POTASSIUM SERPL-SCNC: 3.7 MMOL/L (ref 3.5–5.1)
PROT SERPL-MCNC: 7.7 G/DL (ref 6–8.4)
PROT UR QL STRIP: NEGATIVE
RBC # BLD AUTO: 3.92 M/UL (ref 4–5.4)
SAMPLE: NORMAL
SODIUM SERPL-SCNC: 140 MMOL/L (ref 136–145)
SP GR UR STRIP: 1.02 (ref 1–1.03)
URN SPEC COLLECT METH UR: ABNORMAL
UROBILINOGEN UR STRIP-ACNC: ABNORMAL EU/DL
WBC # BLD AUTO: 8.98 K/UL (ref 3.9–12.7)

## 2021-12-05 PROCEDURE — 85025 COMPLETE CBC W/AUTO DIFF WBC: CPT | Performed by: EMERGENCY MEDICINE

## 2021-12-05 PROCEDURE — 87389 HIV-1 AG W/HIV-1&-2 AB AG IA: CPT | Performed by: EMERGENCY MEDICINE

## 2021-12-05 PROCEDURE — 82565 ASSAY OF CREATININE: CPT

## 2021-12-05 PROCEDURE — 80053 COMPREHEN METABOLIC PANEL: CPT | Performed by: EMERGENCY MEDICINE

## 2021-12-05 PROCEDURE — 86803 HEPATITIS C AB TEST: CPT | Performed by: EMERGENCY MEDICINE

## 2021-12-05 PROCEDURE — 99900035 HC TECH TIME PER 15 MIN (STAT)

## 2021-12-05 PROCEDURE — 83690 ASSAY OF LIPASE: CPT | Performed by: EMERGENCY MEDICINE

## 2021-12-17 ENCOUNTER — IMMUNIZATION (OUTPATIENT)
Dept: INTERNAL MEDICINE | Facility: CLINIC | Age: 41
End: 2021-12-17
Payer: MEDICARE

## 2021-12-17 ENCOUNTER — LAB VISIT (OUTPATIENT)
Dept: LAB | Facility: HOSPITAL | Age: 41
End: 2021-12-17
Attending: INTERNAL MEDICINE
Payer: MEDICARE

## 2021-12-17 ENCOUNTER — OFFICE VISIT (OUTPATIENT)
Dept: INTERNAL MEDICINE | Facility: CLINIC | Age: 41
End: 2021-12-17
Payer: MEDICARE

## 2021-12-17 VITALS
SYSTOLIC BLOOD PRESSURE: 130 MMHG | HEIGHT: 66 IN | BODY MASS INDEX: 29.94 KG/M2 | WEIGHT: 186.31 LBS | DIASTOLIC BLOOD PRESSURE: 70 MMHG | HEART RATE: 68 BPM

## 2021-12-17 DIAGNOSIS — E09.9 STEROID-INDUCED DIABETES MELLITUS, SUBSEQUENT ENCOUNTER: ICD-10-CM

## 2021-12-17 DIAGNOSIS — I15.2 HYPERTENSION ASSOCIATED WITH DIABETES: ICD-10-CM

## 2021-12-17 DIAGNOSIS — R10.9 ABDOMINAL PAIN, UNSPECIFIED ABDOMINAL LOCATION: ICD-10-CM

## 2021-12-17 DIAGNOSIS — E78.2 MIXED HYPERLIPIDEMIA: ICD-10-CM

## 2021-12-17 DIAGNOSIS — E11.59 HYPERTENSION ASSOCIATED WITH DIABETES: ICD-10-CM

## 2021-12-17 DIAGNOSIS — G70.00 MG (MYASTHENIA GRAVIS): Primary | ICD-10-CM

## 2021-12-17 DIAGNOSIS — T38.0X5D STEROID-INDUCED DIABETES MELLITUS, SUBSEQUENT ENCOUNTER: ICD-10-CM

## 2021-12-17 LAB
ALBUMIN SERPL BCP-MCNC: 3.7 G/DL (ref 3.5–5.2)
ALP SERPL-CCNC: 98 U/L (ref 55–135)
ALT SERPL W/O P-5'-P-CCNC: 16 U/L (ref 10–44)
ANION GAP SERPL CALC-SCNC: 6 MMOL/L (ref 8–16)
AST SERPL-CCNC: 18 U/L (ref 10–40)
BILIRUB SERPL-MCNC: 1 MG/DL (ref 0.1–1)
BUN SERPL-MCNC: 14 MG/DL (ref 6–20)
CALCIUM SERPL-MCNC: 9.3 MG/DL (ref 8.7–10.5)
CHLORIDE SERPL-SCNC: 105 MMOL/L (ref 95–110)
CO2 SERPL-SCNC: 29 MMOL/L (ref 23–29)
CREAT SERPL-MCNC: 0.8 MG/DL (ref 0.5–1.4)
EST. GFR  (AFRICAN AMERICAN): >60 ML/MIN/1.73 M^2
EST. GFR  (NON AFRICAN AMERICAN): >60 ML/MIN/1.73 M^2
ESTIMATED AVG GLUCOSE: 131 MG/DL (ref 68–131)
GLUCOSE SERPL-MCNC: 83 MG/DL (ref 70–110)
HBA1C MFR BLD: 6.2 % (ref 4–5.6)
POTASSIUM SERPL-SCNC: 3.7 MMOL/L (ref 3.5–5.1)
PROT SERPL-MCNC: 7.5 G/DL (ref 6–8.4)
SODIUM SERPL-SCNC: 140 MMOL/L (ref 136–145)

## 2021-12-17 PROCEDURE — 99214 OFFICE O/P EST MOD 30 MIN: CPT | Mod: S$GLB,,, | Performed by: INTERNAL MEDICINE

## 2021-12-17 PROCEDURE — 83036 HEMOGLOBIN GLYCOSYLATED A1C: CPT | Performed by: INTERNAL MEDICINE

## 2021-12-17 PROCEDURE — 80053 COMPREHEN METABOLIC PANEL: CPT | Performed by: INTERNAL MEDICINE

## 2021-12-17 PROCEDURE — 90686 IIV4 VACC NO PRSV 0.5 ML IM: CPT | Mod: PBBFAC

## 2021-12-17 PROCEDURE — 99999 PR PBB SHADOW E&M-EST. PATIENT-LVL III: ICD-10-PCS | Mod: PBBFAC,,, | Performed by: INTERNAL MEDICINE

## 2021-12-17 PROCEDURE — 99999 PR PBB SHADOW E&M-EST. PATIENT-LVL III: CPT | Mod: PBBFAC,,, | Performed by: INTERNAL MEDICINE

## 2021-12-17 PROCEDURE — 36415 COLL VENOUS BLD VENIPUNCTURE: CPT | Performed by: INTERNAL MEDICINE

## 2021-12-17 PROCEDURE — 99214 PR OFFICE/OUTPT VISIT, EST, LEVL IV, 30-39 MIN: ICD-10-PCS | Mod: S$GLB,,, | Performed by: INTERNAL MEDICINE

## 2021-12-17 PROCEDURE — 99213 OFFICE O/P EST LOW 20 MIN: CPT | Mod: PBBFAC,25 | Performed by: INTERNAL MEDICINE

## 2021-12-17 RX ORDER — PYRIDOSTIGMINE BROMIDE 60 MG/1
60 TABLET ORAL 3 TIMES DAILY
COMMUNITY
Start: 2021-11-30 | End: 2022-09-26 | Stop reason: SDUPTHER

## 2021-12-17 RX ORDER — PANTOPRAZOLE SODIUM 20 MG/1
20 TABLET, DELAYED RELEASE ORAL DAILY
Qty: 90 TABLET | Refills: 3 | Status: SHIPPED | OUTPATIENT
Start: 2021-12-17

## 2021-12-20 ENCOUNTER — TELEPHONE (OUTPATIENT)
Dept: INTERNAL MEDICINE | Facility: CLINIC | Age: 41
End: 2021-12-20
Payer: MEDICARE

## 2021-12-20 DIAGNOSIS — G70.00 MG (MYASTHENIA GRAVIS): Primary | ICD-10-CM

## 2021-12-20 RX ORDER — PREDNISONE 10 MG/1
10 TABLET ORAL 2 TIMES DAILY
Qty: 180 TABLET | Refills: 3 | Status: SHIPPED | OUTPATIENT
Start: 2021-12-20

## 2021-12-21 ENCOUNTER — IMMUNIZATION (OUTPATIENT)
Dept: INTERNAL MEDICINE | Facility: CLINIC | Age: 41
End: 2021-12-21
Payer: MEDICARE

## 2021-12-21 DIAGNOSIS — Z23 NEED FOR VACCINATION: Primary | ICD-10-CM

## 2021-12-21 PROCEDURE — 0004A COVID-19, MRNA, LNP-S, PF, 30 MCG/0.3 ML DOSE VACCINE: CPT | Mod: PBBFAC,CV19

## 2022-02-17 DIAGNOSIS — T38.0X5A STEROID-INDUCED DIABETES: ICD-10-CM

## 2022-02-17 DIAGNOSIS — E09.9 STEROID-INDUCED DIABETES: ICD-10-CM

## 2022-02-17 DIAGNOSIS — E78.5 HYPERLIPIDEMIA, UNSPECIFIED HYPERLIPIDEMIA TYPE: ICD-10-CM

## 2022-02-17 RX ORDER — ROSUVASTATIN CALCIUM 20 MG/1
TABLET, COATED ORAL
Qty: 90 TABLET | Refills: 3 | OUTPATIENT
Start: 2022-02-17

## 2022-02-17 NOTE — TELEPHONE ENCOUNTER
No new care gaps identified.  Powered by [employer]. Reference number: [id number].   2/17/2022 3:16:32 PM CST

## 2022-02-18 NOTE — TELEPHONE ENCOUNTER
Gato CARL. Previously Denied   Refill Authorization Note   Vicky Joy  is requesting a refill authorization.  Brief Assessment and Rationale for Refill:  Quick Discontinue  Medication Therapy Plan:  patient not taking per note 12/17/21    Medication Reconciliation Completed:  No      Comments:   Pended Medication(s)       Requested Prescriptions     Refused Prescriptions Disp Refills    rosuvastatin (CRESTOR) 20 MG tablet [Pharmacy Med Name: ROSUVASTATIN CALCIUM 20 MG TAB] 90 tablet 3     Sig: TAKE 1 TABLET BY MOUTH EVERY DAY        Duplicate Pended Encounter(s)/ Last Prescribed Details: (includes pharmacy & prescriber details)   Previously discontinued on 12/17/21021         Note composed:8:05 PM 02/17/2022

## 2022-02-23 DIAGNOSIS — D84.9 IMMUNOSUPPRESSED STATUS: ICD-10-CM

## 2022-05-11 DIAGNOSIS — E11.9 TYPE 2 DIABETES MELLITUS WITHOUT COMPLICATION: ICD-10-CM

## 2022-05-26 DIAGNOSIS — E11.9 TYPE 2 DIABETES MELLITUS WITHOUT COMPLICATION, UNSPECIFIED WHETHER LONG TERM INSULIN USE: ICD-10-CM

## 2022-05-30 ENCOUNTER — PATIENT MESSAGE (OUTPATIENT)
Dept: ADMINISTRATIVE | Facility: HOSPITAL | Age: 42
End: 2022-05-30
Payer: MEDICARE

## 2022-06-29 DIAGNOSIS — E11.9 TYPE 2 DIABETES MELLITUS WITHOUT COMPLICATION: ICD-10-CM

## 2022-07-06 DIAGNOSIS — E11.9 TYPE 2 DIABETES MELLITUS WITHOUT COMPLICATION: ICD-10-CM

## 2022-07-07 ENCOUNTER — TELEPHONE (OUTPATIENT)
Dept: INTERNAL MEDICINE | Facility: CLINIC | Age: 42
End: 2022-07-07
Payer: MEDICARE

## 2022-07-07 NOTE — TELEPHONE ENCOUNTER
----- Message from Laura Bray sent at 7/7/2022 11:26 AM CDT -----  Contact: Vicky   Vicky would like  a call back. She wants to see if she can get a shot record.

## 2022-08-24 ENCOUNTER — PATIENT MESSAGE (OUTPATIENT)
Dept: ADMINISTRATIVE | Facility: HOSPITAL | Age: 42
End: 2022-08-24
Payer: MEDICAID

## 2022-09-15 ENCOUNTER — PATIENT MESSAGE (OUTPATIENT)
Dept: ADMINISTRATIVE | Facility: HOSPITAL | Age: 42
End: 2022-09-15
Payer: MEDICAID

## 2022-09-26 DIAGNOSIS — G70.00 MG (MYASTHENIA GRAVIS): ICD-10-CM

## 2022-09-26 RX ORDER — AZATHIOPRINE 50 MG/1
100 TABLET ORAL 2 TIMES DAILY
Qty: 360 TABLET | Refills: 2 | Status: SHIPPED | OUTPATIENT
Start: 2022-09-26 | End: 2023-02-15 | Stop reason: SDUPTHER

## 2022-09-26 RX ORDER — PYRIDOSTIGMINE BROMIDE 60 MG/1
60 TABLET ORAL 3 TIMES DAILY
Qty: 270 TABLET | Refills: 3 | Status: SHIPPED | OUTPATIENT
Start: 2022-09-26 | End: 2022-12-25

## 2022-09-26 NOTE — TELEPHONE ENCOUNTER
----- Message from Leni Mendiola sent at 9/26/2022  8:02 AM CDT -----  Contact: self/[phone]  Pt called in regards to letting the dr know that she no longer lives in [place] and need promition to get her rx refilled.     Requesting an RX refill or new RX.  Is this a refill or new RX: refill  RX name and strength (copy/paste from chart): azaTHIOprine (IMURAN) 50 mg Tab 360 tablet 2 11/30/2021  No  Sig: TAKE 2 TABLETS BY MOUTH TWICE A DAY  Is this a 30 day or 90 day RX:   Pharmacy name and phone # (copy/paste from chart):    [facility] #[id number] [address] [address]  Phone: [phone] Fax: [phone]  The doctors have asked that we provide their patients with the following 2 reminders -- prescription refills can take up to 72 hours, and a friendly reminder that in the future you can use your [facility] account to request refills: call back    Requesting an RX refill or new RX.  Is this a refill or new RX: refill  RX name and strength (copy/paste from chart):  pyridostigmine (MESTINON) 60 mg Tab   11/30/2021  --  Sig - Route: Take 60 mg by mouth 3 (three) times daily.   Is this a 30 day or 90 day RX:   Pharmacy name and phone # (copy/paste from chart):    [facility] #[id number] [place], [place] - [address] AT [address]  [address] [address]  Phone: [phone] Fax: [phone]  The doctors have asked that we provide their patients with the following 2 reminders -- prescription refills can take up to 72 hours, and a friendly reminder that in the future you can use your [facility] account to request refills: call back    Please advise

## 2022-09-26 NOTE — TELEPHONE ENCOUNTER
Pt has not been here in over a year and has moved out of area but is requesting a courtesy refill on her meds. Please advise

## 2022-10-10 ENCOUNTER — PATIENT MESSAGE (OUTPATIENT)
Dept: ADMINISTRATIVE | Facility: HOSPITAL | Age: 42
End: 2022-10-10
Payer: MEDICAID

## 2022-12-05 ENCOUNTER — PATIENT MESSAGE (OUTPATIENT)
Dept: ADMINISTRATIVE | Facility: HOSPITAL | Age: 42
End: 2022-12-05
Payer: MEDICAID

## 2022-12-05 ENCOUNTER — PATIENT OUTREACH (OUTPATIENT)
Dept: ADMINISTRATIVE | Facility: HOSPITAL | Age: 42
End: 2022-12-05
Payer: MEDICAID

## 2022-12-05 NOTE — PROGRESS NOTES
Health Maintenance Due   Topic Date Due    Mammogram  Never done    Low Dose Statin  Never done    Pneumococcal Vaccines (Age 0-64) (2 - PCV) 12/15/2016    DEXA Scan  05/15/2020    Foot Exam  03/05/2021    COVID-19 Vaccine (4 - Booster for Pfizer series) 02/15/2022    Diabetes Urine Screening  04/30/2022    Lipid Panel  04/30/2022    Eye Exam  04/30/2022    Hemoglobin A1c  06/17/2022    Influenza Vaccine (1) 09/01/2022     Triggered LINKS and reconciled immunizations. Updated Care Everywhere. Checked for outside lab results in [facility]; no HM lab results found. Checked for outside mammography results in [facility]; no results found. Portal message sent asking pt to schedule annual PCP visit. Chart review completed.

## 2022-12-15 ENCOUNTER — PATIENT MESSAGE (OUTPATIENT)
Dept: ADMINISTRATIVE | Facility: HOSPITAL | Age: 42
End: 2022-12-15
Payer: MEDICAID

## 2023-03-28 ENCOUNTER — TELEPHONE (OUTPATIENT)
Dept: INTERNAL MEDICINE | Facility: CLINIC | Age: 43
End: 2023-03-28
Payer: MEDICAID

## 2023-03-28 DIAGNOSIS — T38.0X5D STEROID-INDUCED DIABETES MELLITUS, SUBSEQUENT ENCOUNTER: Primary | ICD-10-CM

## 2023-03-28 DIAGNOSIS — E09.9 STEROID-INDUCED DIABETES MELLITUS, SUBSEQUENT ENCOUNTER: Primary | ICD-10-CM

## 2023-03-28 NOTE — TELEPHONE ENCOUNTER
Please contact Ms Joy and ask her to come get labs done before Thursday.  She's overdue for an a1c, and it has to be done this month.    Thanks.

## 2024-04-08 ENCOUNTER — PATIENT MESSAGE (OUTPATIENT)
Dept: ADMINISTRATIVE | Facility: HOSPITAL | Age: 44
End: 2024-04-08
Payer: MEDICAID

## 2024-04-08 ENCOUNTER — PATIENT OUTREACH (OUTPATIENT)
Dept: ADMINISTRATIVE | Facility: HOSPITAL | Age: 44
End: 2024-04-08
Payer: MEDICAID

## 2024-04-08 NOTE — LETTER
April 8, 2024            Vicky Joy  [address] [facility] [address]       Dear Ms. Joy,    I am reaching out to you along with Nirmal Razo II, MD.  Your records indicate that you are past due for your annual visit with Nirmal Razo II, MD.  To schedule your annual visit, please reply to this message or contact the clinic at [phone].      If you have changed your PCP or moved out of the area, please let us know so we can update your chart.  We look forward to seeing you soon.    Thank you for choosing [facility].      Sincerely,     Nirmal Razo II, MD and your [facility] Team

## 2024-04-08 NOTE — PROGRESS NOTES
Health Maintenance Due   Topic Date Due    Mammogram  Never done    DEXA Scan  05/15/2020    Hemoglobin A1c  12/17/2022    Influenza Vaccine (1) 09/01/2023    COVID-19 Vaccine (4 - 2023-24 season) 09/01/2023    Cervical Cancer Screening  03/28/2024     Triggered LINKS and reconciled immunizations. Updated Care Everywhere. Pt outreached asking to schedule annual PCP visit with Dr. Razo. Chart review completed.